# Patient Record
Sex: FEMALE | Race: WHITE | Employment: UNEMPLOYED | RURAL
[De-identification: names, ages, dates, MRNs, and addresses within clinical notes are randomized per-mention and may not be internally consistent; named-entity substitution may affect disease eponyms.]

---

## 2022-06-15 ENCOUNTER — TELEPHONE (OUTPATIENT)
Dept: FAMILY MEDICINE CLINIC | Age: 42
End: 2022-06-15

## 2022-06-20 ENCOUNTER — OFFICE VISIT (OUTPATIENT)
Dept: FAMILY MEDICINE CLINIC | Age: 42
End: 2022-06-20
Payer: MEDICAID

## 2022-06-20 VITALS
SYSTOLIC BLOOD PRESSURE: 148 MMHG | BODY MASS INDEX: 45.99 KG/M2 | HEART RATE: 79 BPM | DIASTOLIC BLOOD PRESSURE: 93 MMHG | TEMPERATURE: 97.6 F | RESPIRATION RATE: 20 BRPM | WEIGHT: 293 LBS | OXYGEN SATURATION: 96 % | HEIGHT: 67 IN

## 2022-06-20 DIAGNOSIS — K50.90 CROHN'S DISEASE WITHOUT COMPLICATION, UNSPECIFIED GASTROINTESTINAL TRACT LOCATION (HCC): ICD-10-CM

## 2022-06-20 DIAGNOSIS — Z72.0 TOBACCO ABUSE: ICD-10-CM

## 2022-06-20 DIAGNOSIS — M54.42 ACUTE MIDLINE LOW BACK PAIN WITH LEFT-SIDED SCIATICA: ICD-10-CM

## 2022-06-20 DIAGNOSIS — R10.30 LOWER ABDOMINAL PAIN: ICD-10-CM

## 2022-06-20 DIAGNOSIS — F41.9 ANXIETY: Primary | ICD-10-CM

## 2022-06-20 DIAGNOSIS — R03.0 ELEVATED BLOOD PRESSURE READING WITHOUT DIAGNOSIS OF HYPERTENSION: ICD-10-CM

## 2022-06-20 LAB
BILIRUB UR QL STRIP: NEGATIVE
GLUCOSE UR-MCNC: NEGATIVE MG/DL
KETONES P FAST UR STRIP-MCNC: NEGATIVE MG/DL
PH UR STRIP: 6 [PH] (ref 4.6–8)
PROT UR QL STRIP: NORMAL
SP GR UR STRIP: 1.02 (ref 1–1.03)
UA UROBILINOGEN AMB POC: NORMAL (ref 0.2–1)
URINALYSIS CLARITY POC: CLEAR
URINALYSIS COLOR POC: YELLOW
URINE BLOOD POC: NORMAL
URINE LEUKOCYTES POC: NORMAL
URINE NITRITES POC: NEGATIVE

## 2022-06-20 PROCEDURE — 81003 URINALYSIS AUTO W/O SCOPE: CPT | Performed by: FAMILY MEDICINE

## 2022-06-20 PROCEDURE — 99204 OFFICE O/P NEW MOD 45 MIN: CPT | Performed by: FAMILY MEDICINE

## 2022-06-20 RX ORDER — ACETAMINOPHEN 325 MG/1
TABLET ORAL
COMMUNITY

## 2022-06-20 RX ORDER — PANTOPRAZOLE SODIUM 40 MG/1
40 TABLET, DELAYED RELEASE ORAL DAILY
COMMUNITY
End: 2022-08-10 | Stop reason: SDUPTHER

## 2022-06-20 RX ORDER — INFLIXIMAB 100 MG/10ML
5 INJECTION, POWDER, LYOPHILIZED, FOR SOLUTION INTRAVENOUS ONCE
COMMUNITY

## 2022-06-20 RX ORDER — VARENICLINE TARTRATE 0.5 MG/1
0.5 TABLET, FILM COATED ORAL DAILY
Qty: 60 TABLET | Refills: 0 | Status: SHIPPED | OUTPATIENT
Start: 2022-06-20 | End: 2022-07-14 | Stop reason: SDUPTHER

## 2022-06-20 RX ORDER — METHYLPREDNISOLONE 4 MG/1
4 TABLET ORAL
Qty: 1 DOSE PACK | Refills: 0 | Status: SHIPPED | OUTPATIENT
Start: 2022-06-20 | End: 2022-06-29

## 2022-06-20 RX ORDER — UREA 10 %
100 LOTION (ML) TOPICAL DAILY
COMMUNITY

## 2022-06-20 RX ORDER — CITALOPRAM 10 MG/1
10 TABLET ORAL DAILY
Qty: 30 TABLET | Refills: 1 | Status: SHIPPED | OUTPATIENT
Start: 2022-06-20 | End: 2022-07-14 | Stop reason: SDUPTHER

## 2022-06-20 RX ORDER — ONDANSETRON 4 MG/1
4 TABLET, FILM COATED ORAL
COMMUNITY

## 2022-06-20 RX ORDER — GLUCOSAMINE SULFATE 1500 MG
POWDER IN PACKET (EA) ORAL DAILY
COMMUNITY

## 2022-06-20 RX ORDER — ALBUTEROL SULFATE 90 UG/1
2 AEROSOL, METERED RESPIRATORY (INHALATION)
Qty: 1 EACH | Refills: 1 | Status: SHIPPED | OUTPATIENT
Start: 2022-06-20 | End: 2023-06-15

## 2022-06-20 NOTE — PROGRESS NOTES
1. \"Have you been to the ER, urgent care clinic since your last visit? Hospitalized since your last visit? \" No    2. \"Have you seen or consulted any other health care providers outside of the [address] [place] [address] since your last visit? \" No     3. For patients aged 39-70: Has the patient had a colonoscopy / FIT/ Cologuard? NA - based on age      If the patient is female:    4. For patients aged 41-77: Has the patient had a mammogram within the past 2 years? NA - based on age or sex      11. For patients aged 21-65: Has the patient had a pap smear?  Yes - no Care Gap present    Health Maintenance Due   Topic Date Due    Hepatitis C Screening  Never done    Depression Screen  Never done    COVID-19 Vaccine (1) Never done    DTaP/Tdap/Td series (1 - Tdap) Never done    Cervical cancer screen  Never done

## 2022-06-20 NOTE — PATIENT INSTRUCTIONS

## 2022-06-20 NOTE — PROGRESS NOTES
[facility]    History of Present Illness:   Krystina Johnston is a 46 y.o. female here for   Chief Complaint   Patient presents with   [facility] Establish Care    Abdominal Pain     Chrohn's is acting up. HPI:  New patient here to establish with me today. Moved from [place] to [place] in the past month. Has a history of Crohn's was previously on Remicade. Her last treatment was in April and she normally gets it every 8 weeks. She was due  but is waiting on her insurance and has not established with GI here. She complains of some abdominal cramping but no blood in her stool or mucus. She also complains of back pain spasms. She is taking care of an 25month-old along with her 3year-old autistic daughter. She is a smoker and was previously on Chantix. She does complain of some shortness of breath. She previously was on Celexa for anxiety. Health Maintenance  Health Maintenance Due   Topic Date Due    Hepatitis C Screening  Never done    Depression Screen  Never done    COVID-19 Vaccine (1) Never done    Pneumococcal 0-64 years (1 - PCV) Never done    DTaP/Tdap/Td series (1 - Tdap) Never done    Cervical cancer screen  Never done       Past Medical, Family, and Social History:     Past Medical History:   Diagnosis Date    GERD (gastroesophageal reflux disease)       Past Surgical History:   Procedure Laterality Date    HX  SECTION  2018    IR CHOLECYSTOSTOMY PERCUTANEOUS         Current Outpatient Medications on File Prior to Visit   Medication Sig Dispense Refill    pantoprazole (Protonix) 40 mg tablet Take 40 mg by mouth daily.  ondansetron hcl (Zofran) 4 mg tablet Take 4 mg by mouth every eight (8) hours as needed for Nausea or Vomiting.  acetaminophen (TylenoL) 325 mg tablet Take  by mouth every four (4) hours as needed for Pain.  cyanocobalamin (Vitamin B-12) 100 mcg tablet Take 100 mcg by mouth daily.       cholecalciferol (Vitamin D3) 25 mcg (1,000 unit) cap Take  by mouth daily.  inFLIXimab (REMICADE) 100 mg injection 5 mg/kg by IntraVENous route once. No current facility-administered medications on file prior to visit. There is no problem list on file for this patient. Social History     Socioeconomic History    Marital status: SINGLE   Tobacco Use    Smoking status: Current Every Day Smoker    Smokeless tobacco: Never Used   Substance and Sexual Activity    Alcohol use: Not Currently    Drug use: Not Currently        Review of Systems   Review of Systems   Constitutional: Negative for chills and fever. Respiratory: Negative for cough and shortness of breath. Cardiovascular: Negative for chest pain. Gastrointestinal: Positive for abdominal pain. Negative for blood in stool. Psychiatric/Behavioral: Positive for depression. Negative for suicidal ideas. The patient is nervous/anxious. Objective:     Visit Vitals  BP (!) (P) 151/93 (BP 1 Location: Left lower arm, BP Patient Position: Sitting, BP Cuff Size: Large adult)   Pulse 79   Temp 97.6 °F (36.4 °C) (Oral)   Resp 20   Ht 5' 7\" (1.702 m)   Wt (!) 363 lb 9.6 oz (164.9 kg)   SpO2 96%   BMI 56.95 kg/m²        Physical Exam  Vitals and nursing note reviewed. Constitutional:       General: She is not in acute distress. Appearance: Normal appearance. She is obese. Cardiovascular:      Rate and Rhythm: Normal rate and regular rhythm. Heart sounds: Normal heart sounds. Pulmonary:      Effort: Pulmonary effort is normal. No respiratory distress. Breath sounds: Normal breath sounds. No wheezing, rhonchi or rales. Musculoskeletal:      Cervical back: Normal.      Thoracic back: Normal.      Lumbar back: Normal.      Comments: Negative SLR  Strength 5/5 BLE     Neurological:      General: No focal deficit present. Mental Status: She is alert and oriented to person, place, and time.    Psychiatric:         Mood and Affect: Mood normal.         Thought Content: Thought content normal.         Judgment: Judgment normal.      Comments: tearful         Pertinent Labs/Studies:  Testing preformed onsite at today's visit:  Results for orders placed or performed in visit on 06/20/22   AMB POC URINALYSIS DIP STICK AUTO W/O MICRO   Result Value Ref Range    Color (UA POC) Yellow     Clarity (UA POC) Clear     Glucose (UA POC) Negative Negative    Bilirubin (UA POC) Negative Negative    Ketones (UA POC) Negative Negative    Specific gravity (UA POC) 1.020 1.001 - 1.035    Blood (UA POC) Trace Negative    pH (UA POC) 6 4.6 - 8.0    Protein (UA POC) 1+ Negative    Urobilinogen (UA POC) 0.2 mg/dL 0.2 - 1    Nitrites (UA POC) Negative Negative    Leukocyte esterase (UA POC) 3+ Negative       Assessment and orders:       ICD-10-CM ICD-9-CM    1. Anxiety  F41.9 300.00 citalopram (CELEXA) 10 mg tablet   2. Lower abdominal pain  R10.30 789.09 AMB POC URINALYSIS DIP STICK AUTO W/O MICRO   3. Acute midline low back pain with left-sided sciatica  M54.42 724.2 methylPREDNISolone (MEDROL DOSEPACK) 4 mg tablet     724.3    4. Crohn's disease without complication, unspecified gastrointestinal tract location (HCC)  K50.90 555.9 REFERRAL TO GASTROENTEROLOGY   5. Tobacco abuse  Z72.0 305.1 albuterol (PROVENTIL HFA, VENTOLIN HFA, PROAIR HFA) 90 mcg/actuation inhaler      varenicline (CHANTIX) 0.5 mg tablet   6. Elevated blood pressure reading without diagnosis of hypertension  R03.0 796.2      Diagnoses and all orders for this visit:    1. Anxiety  -     citalopram (CELEXA) 10 mg tablet; Take 1 Tablet by mouth daily. 2. Lower abdominal pain  -     AMB POC URINALYSIS DIP STICK AUTO W/O MICRO    3. Acute midline low back pain with left-sided sciatica  -     methylPREDNISolone (MEDROL DOSEPACK) 4 mg tablet; Take 1 Tablet by mouth Specific Days and Specific Times. 24 mg on day 1, then decrease by 4 mg /day x 5 days per instructions    4.  Crohn's disease without complication, unspecified gastrointestinal tract location ([facility] [address]  -     REFERRAL TO GASTROENTEROLOGY    5. Tobacco abuse  -     albuterol (PROVENTIL HFA, VENTOLIN HFA, PROAIR HFA) 90 mcg/actuation inhaler; Take 2 Puffs by inhalation every four (4) hours as needed for Wheezing for up to 360 days. Indications: asthma attack, use covered gerneric.  -     varenicline (CHANTIX) 0.5 mg tablet; Take 1 Tablet by mouth daily. X 3 days then 0.5 mg po bid x 4 days then 1 mg twice daily    6. Elevated blood pressure reading without diagnosis of hypertension    Patient Instructions  I advised reduction of dietary salt intake, DASH diet, take medications as prescribed and exercise daily. Follow-up and Dispositions    · Return in about 1 week (around 6/27/2022) for blood pressure. the following changes in treatment are made: start celexa  reviewed diet, exercise and weight control  very strongly urged to quit smoking to reduce cardiovascular risk    RECOMMENDATIONS given include: F/U with MD if pain worsens or fails to resolve as anticipated. F/U ASAP for any new associated numbness, weakness or GI/ incontinence or fevers or chills. I have discussed the diagnosis with the patient and the intended plan as seen in the above orders. Social history, medical history, and labs were reviewed. The patient has received an after-visit summary and questions were answered concerning future plans. I have discussed medication side effects and warnings with the patient as well. Patient/guardian verbalized understanding and accepts plan & risks. Spent 47 min with patient, reviewing chart and face to face exam, clinical documentation.     MD ABDON Leigh & DEL ARGUETA [facility]  06/20/22

## 2022-06-29 ENCOUNTER — OFFICE VISIT (OUTPATIENT)
Dept: FAMILY MEDICINE CLINIC | Age: 42
End: 2022-06-29
Payer: MEDICAID

## 2022-06-29 VITALS
BODY MASS INDEX: 45.99 KG/M2 | OXYGEN SATURATION: 98 % | RESPIRATION RATE: 20 BRPM | TEMPERATURE: 99.1 F | HEART RATE: 90 BPM | WEIGHT: 293 LBS | HEIGHT: 67 IN | SYSTOLIC BLOOD PRESSURE: 123 MMHG | DIASTOLIC BLOOD PRESSURE: 75 MMHG

## 2022-06-29 DIAGNOSIS — R03.0 ELEVATED BLOOD PRESSURE READING WITHOUT DIAGNOSIS OF HYPERTENSION: Primary | ICD-10-CM

## 2022-06-29 DIAGNOSIS — M54.2 CERVICALGIA: ICD-10-CM

## 2022-06-29 PROCEDURE — 99213 OFFICE O/P EST LOW 20 MIN: CPT | Performed by: FAMILY MEDICINE

## 2022-06-29 RX ORDER — DICLOFENAC SODIUM 10 MG/G
4 GEL TOPICAL 4 TIMES DAILY
Qty: 100 G | Refills: 5 | Status: SHIPPED | OUTPATIENT
Start: 2022-06-29

## 2022-06-29 NOTE — PROGRESS NOTES
1. \"Have you been to the ER, urgent care clinic since your last visit? Hospitalized since your last visit? \" No    2. \"Have you seen or consulted any other health care providers outside of the [address] [place] [address] since your last visit? \" No     3. For patients aged 39-70: Has the patient had a colonoscopy / FIT/ Cologuard? NA - based on age      If the patient is female:    4. For patients aged 41-77: Has the patient had a mammogram within the past 2 years? NA - based on age or sex      11. For patients aged 21-65: Has the patient had a pap smear?  No    Health Maintenance Due   Topic Date Due    Hepatitis C Screening  Never done    Depression Screen  Never done    COVID-19 Vaccine (1) Never done    Pneumococcal 0-64 years (1 - PCV) Never done    DTaP/Tdap/Td series (1 - Tdap) Never done    Cervical cancer screen  Never done

## 2022-06-29 NOTE — PROGRESS NOTES
[facility]    History of Present Illness:   Kerry Hancock is a 46 y.o. female here for   Chief Complaint   Patient presents with    Follow-up     blood pressure          HPI:  Here for follow-up elevated blood pressure. She was seen last week as a new patient-- blood pressure was 148/93. No history of hypertension. Advised DASH diet. Has a history of Crohn's was previously on Remicade. Her last treatment was in April and she normally gets it every 8 weeks. She was due  but is waiting on her insurance . She saw GI yesterday. She continues to complains of neck pain. Says it hurts when she bends  her head forward and goes into her shoulder. she is taking care of an 25month-old along with her 3year-old autistic daughter. We restarted Celexa for anxiety last week. Started Chantix last week to help with smoking cessation. Tolerating both meds fine. Past Medical, Family, and Social History:     Past Medical History:   Diagnosis Date    GERD (gastroesophageal reflux disease)       Past Surgical History:   Procedure Laterality Date    HX  SECTION  2018    IR CHOLECYSTOSTOMY PERCUTANEOUS         Current Outpatient Medications on File Prior to Visit   Medication Sig Dispense Refill    pantoprazole (Protonix) 40 mg tablet Take 40 mg by mouth daily.  ondansetron hcl (Zofran) 4 mg tablet Take 4 mg by mouth every eight (8) hours as needed for Nausea or Vomiting.  acetaminophen (TylenoL) 325 mg tablet Take  by mouth every four (4) hours as needed for Pain.  cyanocobalamin (Vitamin B-12) 100 mcg tablet Take 100 mcg by mouth daily.  cholecalciferol (Vitamin D3) 25 mcg (1,000 unit) cap Take  by mouth daily.  inFLIXimab (REMICADE) 100 mg injection 5 mg/kg by IntraVENous route once.       albuterol (PROVENTIL HFA, VENTOLIN HFA, PROAIR HFA) 90 mcg/actuation inhaler Take 2 Puffs by inhalation every four (4) hours as needed for Wheezing for up to 360 days. Indications: asthma attack, use covered gerneric. 1 Each 1    varenicline (CHANTIX) 0.5 mg tablet Take 1 Tablet by mouth daily. X 3 days then 0.5 mg po bid x 4 days then 1 mg twice daily 60 Tablet 0    citalopram (CELEXA) 10 mg tablet Take 1 Tablet by mouth daily. 30 Tablet 1    [DISCONTINUED] methylPREDNISolone (MEDROL DOSEPACK) 4 mg tablet Take 1 Tablet by mouth Specific Days and Specific Times. 24 mg on day 1, then decrease by 4 mg /day x 5 days per instructions 1 Dose Pack 0     No current facility-administered medications on file prior to visit. There is no problem list on file for this patient. Social History     Socioeconomic History    Marital status: SINGLE   Tobacco Use    Smoking status: Current Every Day Smoker    Smokeless tobacco: Never Used   Substance and Sexual Activity    Alcohol use: Not Currently    Drug use: Not Currently        Review of Systems   Review of Systems   Constitutional: Negative for chills and fever. Respiratory: Negative for cough and shortness of breath. Musculoskeletal: Positive for back pain and neck pain. Objective:     Visit Vitals  /75 (BP 1 Location: Left lower arm, BP Patient Position: Sitting, BP Cuff Size: Large adult)   Pulse 90   Temp 99.1 °F (37.3 °C) (Oral)   Resp 20   Ht 5' 7\" (1.702 m)   Wt (!) 352 lb (159.7 kg)   SpO2 98%   BMI 55.13 kg/m²        Physical Exam  PHYSICAL EXAM:  Gen: Pt sitting in chair, in NAD  Head: Normocephalic, atraumatic  Eyes: Sclera anicteric, EOM grossly intact,  Ears: TM's pearly with good light reflex b/l  Throat: MMM, normal lips, tongue, teeth and gums  Neck: No tenderness to palpation of the cervical spine. Some pain with flexion. CVS: Normal S1, S2, no m/r/g  Resp: CTAB, no wheezes or rales  Neuro: Alert, oriented, appropriate          Assessment and orders:       ICD-10-CM ICD-9-CM    1.  Elevated blood pressure reading without diagnosis of hypertension  R03.0 796.2 2. Cervicalgia  M54.2 723.1 diclofenac (VOLTAREN) 1 % gel     Diagnoses and all orders for this visit:    1. Elevated blood pressure reading without diagnosis of hypertension  Improved after sitting. Patient Instructions  I advised reduction of dietary salt intake, DASH diet, and exercise daily. 2. Cervicalgia  -     diclofenac (VOLTAREN) 1 % gel; Apply 4 g to affected area four (4) times daily. Advised range of motion exercises. I offered physical therapy referral but due to childcare issues she is unable to do that at this time. Follow-up next month for recheck on Celexa and Chantix. Follow-up and Dispositions    · Return in about 4 weeks (around 7/27/2022). current treatment plan is effective, no change in therapy      I have discussed the diagnosis with the patient and the intended plan as seen in the above orders. Social history, medical history, and labs were reviewed. The patient has received an after-visit summary and questions were answered concerning future plans. I have discussed medication side effects and warnings with the patient as well. Patient/guardian verbalized understanding and accepts plan & risks.      MD ABDON Kaminski & DEL ARGUETA [facility]  06/29/22

## 2022-06-29 NOTE — PATIENT INSTRUCTIONS
Neck: Exercises  Introduction  Here are some examples of exercises for you to try. The exercises may be suggested for a condition or for rehabilitation. Start each exercise slowly. Ease off the exercises if you start to have pain. You will be told when to start these exercises and which ones will work best for you. How to do the exercises  Neck stretch    1. This stretch works best if you keep your shoulder down as you lean away from it. To help you remember to do this, start by relaxing your shoulders and lightly holding on to your thighs or your chair. 2. Tilt your head toward your shoulder and hold for 15 to 30 seconds. Let the weight of your head stretch your muscles. 3. If you would like a little added stretch, use your hand to gently and steadily pull your head toward your shoulder. For example, keeping your right shoulder down, lean your head to the left. 4. Repeat 2 to 4 times toward each shoulder. Diagonal neck stretch    1. Turn your head slightly toward the direction you will be stretching, and tilt your head diagonally toward your chest and hold for 15 to 30 seconds. 2. If you would like a little added stretch, use your hand to gently and steadily pull your head forward on the diagonal.  3. Repeat 2 to 4 times toward each side. Dorsal glide stretch    The dorsal glide stretches the back of the neck. If you feel pain, do not glide so far back. Some people find this exercise easier to do while lying on their backs with an ice pack on the neck. 1. Sit or stand tall and look straight ahead. 2. Slowly tuck your chin as you glide your head backward over your body  3. Hold for a count of 6, and then relax for up to 10 seconds. 4. Repeat 8 to 12 times. Chest and shoulder stretch    1. Sit or stand tall and glide your head backward as in the dorsal glide stretch. 2. Raise both arms so that your hands are next to your ears.   3. Take a deep breath, and as you breathe out, lower your elbows down and behind your back. You will feel your shoulder blades slide down and together, and at the same time you will feel a stretch across your chest and the front of your shoulders. 4. Hold for about 6 seconds, and then relax for up to 10 seconds. 5. Repeat 8 to 12 times. Strengthening: Hands on head    1. Move your head backward, forward, and side to side against gentle pressure from your hands, holding each position for about 6 seconds. 2. Repeat 8 to 12 times. Follow-up care is a key part of your treatment and safety. Be sure to make and go to all appointments, and call your doctor if you are having problems. It's also a good idea to know your test results and keep a list of the medicines you take. Where can you learn more? Go to http://www.barraza.com/  Enter P975 in the search box to learn more about \"Neck: Exercises. \"  Current as of: July 1, 2021               Content Version: 13.2  © 2006-2022 [employer]. Care instructions adapted under license by [employer] (which disclaims liability or warranty for this information). If you have questions about a medical condition or this instruction, always ask your healthcare professional. [address] any warranty or liability for your use of this information.

## 2022-07-14 DIAGNOSIS — F41.9 ANXIETY: ICD-10-CM

## 2022-07-14 DIAGNOSIS — Z72.0 TOBACCO ABUSE: ICD-10-CM

## 2022-07-14 RX ORDER — CITALOPRAM 10 MG/1
10 TABLET ORAL DAILY
Qty: 30 TABLET | Refills: 1 | Status: SHIPPED | OUTPATIENT
Start: 2022-07-14

## 2022-07-14 RX ORDER — VARENICLINE TARTRATE 0.5 MG/1
0.5 TABLET, FILM COATED ORAL DAILY
Qty: 60 TABLET | Refills: 0 | Status: SHIPPED | OUTPATIENT
Start: 2022-07-14 | End: 2022-07-25 | Stop reason: ALTCHOICE

## 2022-07-18 ENCOUNTER — NURSE TRIAGE (OUTPATIENT)
Dept: OTHER | Facility: CLINIC | Age: 42
End: 2022-07-18

## 2022-07-18 NOTE — TELEPHONE ENCOUNTER
Received call from [address] at [facility] with Red Flag Complaint. Subjective: Caller states \"Having Chrohn's flare\"     Current Symptoms: Can't eat or drink  Bloating  Constant gummy BM's    Onset: 2 days ago    Pain Severity: 8/10, constant, sharp, stabbing    Temperature: Denies fever, chills and sweats     What has been tried: Remicade due 6/2 and did not have d/t moving and not getting     History related to the reason for today's call: Chrohns    LMP: NA Pregnant: No    Recommended disposition: See in Office Within 2 Weeks    Care advice provided, patient verbalizes understanding; denies any other questions or concerns; instructed to call back for any new or worsening symptoms. Patient/Caller agrees with recommended disposition; writer provided warm transfer to [facility] at [facility] for appointment scheduling    Attention Provider: Thank you for allowing me to participate in the care of your patient. The patient was connected to triage in response to information provided to the [facility]. Please do not respond through this encounter as the response is not directed to a shared pool.     Reason for Disposition   Abdominal pain is a chronic symptom (recurrent or ongoing AND lasting > 4 weeks)    Protocols used: ABDOMINAL PAIN - [facility] - ROSINA CHENG

## 2022-07-19 NOTE — TELEPHONE ENCOUNTER
VM left for patient to reach out to Dr. Aguero Side office regarding Chrohanna's flare up as that he is treating her for it.

## 2022-07-22 ENCOUNTER — PATIENT MESSAGE (OUTPATIENT)
Dept: FAMILY MEDICINE CLINIC | Age: 42
End: 2022-07-22

## 2022-07-25 ENCOUNTER — OFFICE VISIT (OUTPATIENT)
Dept: FAMILY MEDICINE CLINIC | Age: 42
End: 2022-07-25
Payer: MEDICAID

## 2022-07-25 VITALS
HEART RATE: 85 BPM | HEIGHT: 67 IN | SYSTOLIC BLOOD PRESSURE: 129 MMHG | WEIGHT: 293 LBS | TEMPERATURE: 98 F | DIASTOLIC BLOOD PRESSURE: 82 MMHG | BODY MASS INDEX: 45.99 KG/M2 | OXYGEN SATURATION: 97 % | RESPIRATION RATE: 20 BRPM

## 2022-07-25 DIAGNOSIS — Z72.0 TOBACCO ABUSE: ICD-10-CM

## 2022-07-25 DIAGNOSIS — R51.9 ACUTE NONINTRACTABLE HEADACHE, UNSPECIFIED HEADACHE TYPE: ICD-10-CM

## 2022-07-25 DIAGNOSIS — N92.1 METRORRHAGIA: ICD-10-CM

## 2022-07-25 DIAGNOSIS — R42 DIZZINESS: Primary | ICD-10-CM

## 2022-07-25 DIAGNOSIS — Z30.013 ENCOUNTER FOR INITIAL PRESCRIPTION OF INJECTABLE CONTRACEPTIVE: ICD-10-CM

## 2022-07-25 LAB
HCG URINE, QL. (POC): NEGATIVE
VALID INTERNAL CONTROL?: YES

## 2022-07-25 PROCEDURE — 81025 URINE PREGNANCY TEST: CPT | Performed by: FAMILY MEDICINE

## 2022-07-25 PROCEDURE — 99214 OFFICE O/P EST MOD 30 MIN: CPT | Performed by: FAMILY MEDICINE

## 2022-07-25 RX ORDER — VARENICLINE TARTRATE 1 MG/1
1 TABLET, FILM COATED ORAL 2 TIMES DAILY
Qty: 60 TABLET | Refills: 1 | Status: SHIPPED | OUTPATIENT
Start: 2022-07-25 | End: 2022-10-03

## 2022-07-25 RX ORDER — SUMATRIPTAN 100 MG/1
100 TABLET, FILM COATED ORAL
Qty: 9 TABLET | Refills: 0 | Status: SHIPPED | OUTPATIENT
Start: 2022-07-25 | End: 2022-07-25

## 2022-07-25 RX ORDER — MEDROXYPROGESTERONE ACETATE 150 MG/ML
150 INJECTION, SUSPENSION INTRAMUSCULAR ONCE
Qty: 1 ML | Refills: 0 | Status: SHIPPED | OUTPATIENT
Start: 2022-07-25 | End: 2022-07-25

## 2022-07-25 RX ORDER — VARENICLINE TARTRATE 1 MG/1
1 TABLET, FILM COATED ORAL
COMMUNITY
Start: 2022-06-28 | End: 2022-07-25 | Stop reason: SDUPTHER

## 2022-07-25 NOTE — PROGRESS NOTES
1. \"Have you been to the ER, urgent care clinic since your last visit? Hospitalized since your last visit? \" No    2. \"Have you seen or consulted any other health care providers outside of the [address] [place] [address] since your last visit? \" No     3. For patients aged 39-70: Has the patient had a colonoscopy / FIT/ Cologuard? NA - based on age      If the patient is female:    4. For patients aged 41-77: Has the patient had a mammogram within the past 2 years? NA - based on age or sex      11. For patients aged 21-65: Has the patient had a pap smear?  Yes - no Care Gap present    Health Maintenance Due   Topic Date Due    Hepatitis C Screening  Never done    Depression Screen  Never done    COVID-19 Vaccine (1) Never done    Pneumococcal 0-64 years (1 - PCV) Never done    DTaP/Tdap/Td series (1 - Tdap) Never done    Cervical cancer screen  Never done

## 2022-07-25 NOTE — PROGRESS NOTES
[facility]    History of Present Illness:   Brenda Manzo is a 46 y.o. female here for   Chief Complaint   Patient presents with    Headache    Dizziness    Medication Refill     Chantix    Irregular Menses         HPI:  Patient complaining of dizziness and headaches. Headache started for about a week located in the front. Intermittent. Tyelnol and excedrin migrane helps some. Unfortunately she cannot take NSAIDs due to Crohn's. She has been in touch with her GI doctor and is awaiting testing. Some blurred vision. No h/o migranes. ? photophobia    On chantix, down to 3 cigs over 3 days. Needs a refill on 1 mg tablets today. She complains of irregular menses. States that her LMP was  and was heavy but only lasted a few days. Previously she had a period of about 19 days earlier. She is not presently sexually active but plans to become so in the future and is interested in Depo. Past Medical, Family, and Social History:     Past Medical History:   Diagnosis Date    Crohn disease ([facility] [address]     GERD (gastroesophageal reflux disease)       Past Surgical History:   Procedure Laterality Date    HX  SECTION  2018    IR CHOLECYSTOSTOMY PERCUTANEOUS  2009       Current Outpatient Medications on File Prior to Visit   Medication Sig Dispense Refill    varenicline (Chantix) 1 mg tablet Take 1 mg by mouth.      citalopram (CELEXA) 10 mg tablet Take 1 Tablet by mouth daily. 30 Tablet 1    [DISCONTINUED] varenicline (CHANTIX) 0.5 mg tablet Take 1 Tablet by mouth daily. X 3 days then 0.5 mg po bid x 4 days then 1 mg twice daily 60 Tablet 0    diclofenac (VOLTAREN) 1 % gel Apply 4 g to affected area four (4) times daily. 100 g 5    pantoprazole (Protonix) 40 mg tablet Take 40 mg by mouth daily. ondansetron hcl (Zofran) 4 mg tablet Take 4 mg by mouth every eight (8) hours as needed for Nausea or Vomiting.       acetaminophen (TylenoL) 325 mg tablet Take  by mouth every four (4) hours as needed for Pain. cyanocobalamin (Vitamin B-12) 100 mcg tablet Take 100 mcg by mouth daily. cholecalciferol (Vitamin D3) 25 mcg (1,000 unit) cap Take  by mouth daily. inFLIXimab (REMICADE) 100 mg injection 5 mg/kg by IntraVENous route once. albuterol (PROVENTIL HFA, VENTOLIN HFA, PROAIR HFA) 90 mcg/actuation inhaler Take 2 Puffs by inhalation every four (4) hours as needed for Wheezing for up to 360 days. Indications: asthma attack, use covered gerneric. 1 Each 1     No current facility-administered medications on file prior to visit. Patient Active Problem List   Diagnosis Code    [facility] disease ([facility] K50.90       Social History     Socioeconomic History    Marital status: SINGLE   Tobacco Use    Smoking status: Every Day    Smokeless tobacco: Never   Substance and Sexual Activity    Alcohol use: Not Currently    Drug use: Not Currently        Review of Systems   Review of Systems   Constitutional:  Negative for chills and fever. Respiratory:  Negative for cough and shortness of breath. Cardiovascular:  Negative for chest pain. Gastrointestinal:  Negative for abdominal pain. Neurological:  Positive for dizziness and headaches. Objective:   Visit Vitals  /82 (BP 1 Location: Left upper arm, BP Patient Position: Sitting, BP Cuff Size: Large adult)   Pulse 85   Temp 98 °F (36.7 °C) (Oral)   Resp 20   Ht 5' 7\" (1.702 m)   Wt (!) 356 lb 9.6 oz (161.8 kg)   SpO2 97%   BMI 55.85 kg/m²        Physical Exam  Vitals and nursing note reviewed. Constitutional:       Appearance: Normal appearance. HENT:      Head: Normocephalic and atraumatic. Right Ear: Tympanic membrane, ear canal and external ear normal.      Left Ear: Tympanic membrane, ear canal and external ear normal.   Eyes:      Extraocular Movements: Extraocular movements intact.       Conjunctiva/sclera: Conjunctivae normal.      Comments: PERRL  No papilledema   Cardiovascular:      Rate and Rhythm: Normal rate and regular rhythm. Heart sounds: Normal heart sounds. Pulmonary:      Effort: Pulmonary effort is normal.      Breath sounds: Normal breath sounds. Abdominal:      General: Abdomen is flat. Bowel sounds are normal.      Palpations: Abdomen is soft. Neurological:      General: No focal deficit present. Mental Status: She is alert and oriented to person, place, and time. Cranial Nerves: No cranial nerve deficit. Pertinent Labs/Studies:  Testing preformed onsite at today's visit:  Results for orders placed or performed in visit on 07/25/22   AMB POC URINE PREGNANCY TEST, VISUAL COLOR COMPARISON   Result Value Ref Range    VALID INTERNAL CONTROL POC Yes     HCG urine, Ql. (POC) Negative Negative           Assessment and orders:       ICD-10-CM ICD-9-CM    1. Dizziness  R42 780.4 CBC WITH AUTOMATED DIFF      METABOLIC PANEL, COMPREHENSIVE      METABOLIC PANEL, COMPREHENSIVE      CBC WITH AUTOMATED DIFF      2. Acute nonintractable headache, unspecified headache type  R51.9 784.0 SUMAtriptan (IMITREX) 100 mg tablet      3. Metrorrhagia  N92.1 626.6 TSH 3RD GENERATION      TSH 3RD GENERATION      AMB POC URINE PREGNANCY TEST, VISUAL COLOR COMPARISON      4. Tobacco abuse  Z72.0 305.1 varenicline (Chantix) 1 mg tablet      5. Encounter for initial prescription of injectable contraceptive  Z30.013 V25.02 medroxyPROGESTERone (DEPO-PROVERA) 150 mg/mL injection        Diagnoses and all orders for this visit:    1. Dizziness  -     CBC WITH AUTOMATED DIFF; Future  -     METABOLIC PANEL, COMPREHENSIVE; Future    2. Acute nonintractable headache, unspecified headache type  -     SUMAtriptan (IMITREX) 100 mg tablet; Take 1 Tablet by mouth once as needed for Migraine for up to 1 dose. Repeat dose in 2 hrs if no improvement    3. Metrorrhagia  -     TSH 3RD GENERATION; Future  -     AMB POC URINE PREGNANCY TEST, VISUAL COLOR COMPARISON    4.  Tobacco abuse  -     varenicline (Chantix) 1 mg tablet; Take 1 Tablet by mouth two (2) times a day. 5. Encounter for initial prescription of injectable contraceptive  -     medroxyPROGESTERone (DEPO-PROVERA) 150 mg/mL injection; 1 mL by IntraMUSCular route once for 1 dose. Follow-up and Dispositions    Return in about 17 days (around 8/11/2022) for contraception. the following changes in treatment are made: Add Imitrex  Headache activity that is not controlled. Advising to start rest, sleeping and modify activities to reduce precipitating factors including: stress, lack of sleep. Further work up to include: Imitrex oral  Labs as ordered  Discussed lifestyle issues (diet, sleep, exercise)  Recheck in 2 weeks, see below orders for details. Discussed strict ER precautions in the case of development of Alarm symptoms (Changes in headaches, stroke-like symptoms, etc). Patient is in agreement with current plan.    lab results and schedule of future lab studies reviewed with patient  very strongly urged to quit smoking to reduce cardiovascular risk  reviewed medications and side effects in detail    Advised patient of importance of using condoms to prevent STDS and pregnancy. I discussed the increased risk of blood clots with estrogen. Urine pregnancy negative here today. Advised to start Depo with on first day of menses if able. Patient verbalized understanding and accepts risks. Spent 35 min with patient, reviewing chart and face to face exam, clinical documentation. I have discussed the diagnosis with the patient and the intended plan as seen in the above orders. Social history, medical history, and labs were reviewed. The patient has received an after-visit summary and questions were answered concerning future plans. I have discussed medication side effects and warnings with the patient as well. Patient/guardian verbalized understanding and accepts plan & risks.      MD ABDON Powell & DEL ARGUETA [facility]  07/25/22

## 2022-07-27 LAB
ALBUMIN SERPL-MCNC: 4.1 G/DL (ref 3.8–4.8)
ALBUMIN/GLOB SERPL: 1.5 {RATIO} (ref 1.2–2.2)
ALP SERPL-CCNC: 88 IU/L (ref 44–121)
ALT SERPL-CCNC: 20 IU/L (ref 0–32)
AST SERPL-CCNC: 20 IU/L (ref 0–40)
BASOPHILS # BLD AUTO: 0.1 X10E3/UL (ref 0–0.2)
BASOPHILS NFR BLD AUTO: 1 %
BILIRUB SERPL-MCNC: 0.4 MG/DL (ref 0–1.2)
BUN SERPL-MCNC: 6 MG/DL (ref 6–20)
BUN/CREAT SERPL: 8 (ref 9–23)
CALCIUM SERPL-MCNC: 9.3 MG/DL (ref 8.7–10.2)
CHLORIDE SERPL-SCNC: 106 MMOL/L (ref 96–106)
CO2 SERPL-SCNC: 19 MMOL/L (ref 20–29)
CREAT SERPL-MCNC: 0.72 MG/DL (ref 0.57–1)
EGFR: 109 ML/MIN/1.73
EOSINOPHIL # BLD AUTO: 0.2 X10E3/UL (ref 0–0.4)
EOSINOPHIL NFR BLD AUTO: 2 %
ERYTHROCYTE [DISTWIDTH] IN BLOOD BY AUTOMATED COUNT: 13.6 % (ref 11.7–15.4)
GLOBULIN SER CALC-MCNC: 2.7 G/DL (ref 1.5–4.5)
GLUCOSE SERPL-MCNC: 82 MG/DL (ref 65–99)
HCT VFR BLD AUTO: 42.3 % (ref 34–46.6)
HGB BLD-MCNC: 13.3 G/DL (ref 11.1–15.9)
IMM GRANULOCYTES # BLD AUTO: 0.1 X10E3/UL (ref 0–0.1)
IMM GRANULOCYTES NFR BLD AUTO: 1 %
LYMPHOCYTES # BLD AUTO: 3.2 X10E3/UL (ref 0.7–3.1)
LYMPHOCYTES NFR BLD AUTO: 32 %
MCH RBC QN AUTO: 29.4 PG (ref 26.6–33)
MCHC RBC AUTO-ENTMCNC: 31.4 G/DL (ref 31.5–35.7)
MCV RBC AUTO: 93 FL (ref 79–97)
MONOCYTES # BLD AUTO: 0.5 X10E3/UL (ref 0.1–0.9)
MONOCYTES NFR BLD AUTO: 5 %
NEUTROPHILS # BLD AUTO: 5.9 X10E3/UL (ref 1.4–7)
NEUTROPHILS NFR BLD AUTO: 59 %
PLATELET # BLD AUTO: 347 X10E3/UL (ref 150–450)
POTASSIUM SERPL-SCNC: 4.1 MMOL/L (ref 3.5–5.2)
PROT SERPL-MCNC: 6.8 G/DL (ref 6–8.5)
RBC # BLD AUTO: 4.53 X10E6/UL (ref 3.77–5.28)
SODIUM SERPL-SCNC: 141 MMOL/L (ref 134–144)
TSH SERPL DL<=0.005 MIU/L-ACNC: 2.23 UIU/ML (ref 0.45–4.5)
WBC # BLD AUTO: 10 X10E3/UL (ref 3.4–10.8)

## 2022-08-10 ENCOUNTER — OFFICE VISIT (OUTPATIENT)
Dept: FAMILY MEDICINE CLINIC | Age: 42
End: 2022-08-10
Payer: MEDICAID

## 2022-08-10 VITALS
SYSTOLIC BLOOD PRESSURE: 107 MMHG | HEIGHT: 67 IN | HEART RATE: 83 BPM | BODY MASS INDEX: 45.99 KG/M2 | RESPIRATION RATE: 20 BRPM | WEIGHT: 293 LBS | OXYGEN SATURATION: 99 % | TEMPERATURE: 98.1 F | DIASTOLIC BLOOD PRESSURE: 73 MMHG

## 2022-08-10 DIAGNOSIS — K21.9 GASTROESOPHAGEAL REFLUX DISEASE WITHOUT ESOPHAGITIS: ICD-10-CM

## 2022-08-10 DIAGNOSIS — J30.1 ALLERGIC RHINITIS DUE TO POLLEN, UNSPECIFIED SEASONALITY: Primary | ICD-10-CM

## 2022-08-10 PROCEDURE — 99213 OFFICE O/P EST LOW 20 MIN: CPT | Performed by: FAMILY MEDICINE

## 2022-08-10 RX ORDER — FLUTICASONE PROPIONATE 50 MCG
2 SPRAY, SUSPENSION (ML) NASAL DAILY
Qty: 1 EACH | Refills: 2 | Status: SHIPPED | OUTPATIENT
Start: 2022-08-10

## 2022-08-10 RX ORDER — PANTOPRAZOLE SODIUM 40 MG/1
40 TABLET, DELAYED RELEASE ORAL DAILY
Qty: 30 TABLET | Refills: 2 | Status: SHIPPED | OUTPATIENT
Start: 2022-08-10

## 2022-08-10 NOTE — PROGRESS NOTES
[facility]    History of Present Illness:   Jack Eugene is a 46 y.o. female here for   Chief Complaint   Patient presents with    Ear Pain    Ear Fullness     Unable to hear out of left ear. Ear feels stopped up. Started 2 days ago. It's making her off balance. HPI:  Patient here today with complaints of left ear feeling stopped up. She noticed it 2 days ago. It is hard to hear out of the ear. It is making her feel off balance. She was seen on  for dizziness. Started on Imitrex for headaches. Had labs done CBC & BMP that were normal.  She is also in a house with 7 cats. Also interested in starting Depo. Health Maintenance  Health Maintenance Due   Topic Date Due    Hepatitis C Screening  Never done    COVID-19 Vaccine (1) Never done    Pneumococcal 0-64 years (1 - PCV) Never done    DTaP/Tdap/Td series (1 - Tdap) Never done    Cervical cancer screen  Never done       Past Medical, Family, and Social History:     Past Medical History:   Diagnosis Date    Anxiety     Crohn disease ([address]     GERD (gastroesophageal reflux disease)       Past Surgical History:   Procedure Laterality Date    HX  SECTION  2018    IR CHOLECYSTOSTOMY PERCUTANEOUS         Current Outpatient Medications on File Prior to Visit   Medication Sig Dispense Refill    varenicline (Chantix) 1 mg tablet Take 1 Tablet by mouth two (2) times a day. 60 Tablet 1    citalopram (CELEXA) 10 mg tablet Take 1 Tablet by mouth daily. 30 Tablet 1    diclofenac (VOLTAREN) 1 % gel Apply 4 g to affected area four (4) times daily. 100 g 5    ondansetron hcl (ZOFRAN) 4 mg tablet Take 4 mg by mouth every eight (8) hours as needed for Nausea or Vomiting. acetaminophen (TYLENOL) 325 mg tablet Take  by mouth every four (4) hours as needed for Pain. cyanocobalamin (VITAMIN B12) 100 mcg tablet Take 100 mcg by mouth daily.       cholecalciferol (VITAMIN D3) 25 mcg (1,000 unit) cap Take  by mouth daily. albuterol (PROVENTIL HFA, VENTOLIN HFA, PROAIR HFA) 90 mcg/actuation inhaler Take 2 Puffs by inhalation every four (4) hours as needed for Wheezing for up to 360 days. Indications: asthma attack, use covered gerneric. 1 Each 1    inFLIXimab (REMICADE) 100 mg injection 5 mg/kg by IntraVENous route once. (Patient not taking: Reported on 8/10/2022)      [DISCONTINUED] pantoprazole (PROTONIX) 40 mg tablet Take 40 mg by mouth daily. (Patient not taking: Reported on 8/10/2022)       No current facility-administered medications on file prior to visit. Patient Active Problem List   Diagnosis Code    [facility] K50.90       Social History     Socioeconomic History    Marital status: SINGLE   Tobacco Use    Smoking status: Every Day    Smokeless tobacco: Never   Substance and Sexual Activity    Alcohol use: Not Currently    Drug use: Not Currently        Review of Systems   Review of Systems   Constitutional:  Negative for chills and fever. HENT:  Positive for ear pain and hearing loss. Negative for congestion and ear discharge. Respiratory:  Negative for cough and shortness of breath. Gastrointestinal:  Positive for heartburn.      Objective:   Visit Vitals  /73 (BP 1 Location: Right upper arm, BP Patient Position: Sitting, BP Cuff Size: Large adult)   Pulse 83   Temp 98.1 °F (36.7 °C) (Oral)   Resp 20   Ht 5' 7\" (1.702 m)   Wt (!) 352 lb 12.8 oz (160 kg)   SpO2 99%   BMI 55.26 kg/m²        Physical Exam  PHYSICAL EXAM:  Gen: Pt sitting in chair, in NAD  Head: Normocephalic, atraumatic  Eyes: Sclera anicteric, EOM grossly intact,  Ears: TM's pearly with good light reflex b/l, small effusion present L ear  Throat: MMM, normal lips, tongue, teeth and gums  Neck: Supple, no LAD,   CVS: Normal S1, S2, no m/r/g  Resp: CTAB, no wheezes or rales  Neuro: Alert, oriented, appropriate    Pertinent Labs/Studies:  3 most recent PHQ Screens 8/10/2022   Little interest or pleasure in doing things Not at all   Feeling down, depressed, irritable, or hopeless Not at all   Total Score PHQ 2 0          Assessment and orders:       ICD-10-CM ICD-9-CM    1. Allergic rhinitis due to pollen, unspecified seasonality  J30.1 477.0 fluticasone propionate (FLONASE) 50 mcg/actuation nasal spray      2. Gastroesophageal reflux disease without esophagitis  K21.9 530.81 pantoprazole (PROTONIX) 40 mg tablet        Diagnoses and all orders for this visit:    1. Allergic rhinitis due to pollen, unspecified seasonality  -     fluticasone propionate (FLONASE) 50 mcg/actuation nasal spray; Take 2 Sprays by Both Nostrils route in the morning. 2. Gastroesophageal reflux disease without esophagitis  -     pantoprazole (PROTONIX) 40 mg tablet; Take 1 Tablet by mouth in the morning. Indications: gastroesophageal reflux disease    Follow-up and Dispositions    Return if symptoms worsen or fail to improve. Trial of flonase, may use saline as well. Avoid afrin due to elevated bp previously. Add claritin if no improvement. I have discussed the diagnosis with the patient and the intended plan as seen in the above orders. Social history, medical history, and labs were reviewed. The patient has received an after-visit summary and questions were answered concerning future plans. I have discussed medication side effects and warnings with the patient as well. Patient/guardian verbalized understanding and accepts plan & risks. Please note that this dictation was completed with [employer], the computer voice recognition software. Quite often unanticipated grammatical, syntax, homophones, and other interpretive errors are inadvertently transcribed by the computer software. Please disregard these errors. Please excuse any errors that have escaped final proofreading. Thank you.      MD ABDON Ramírez & DEL ARGUETA [facility]  08/10/22

## 2022-08-10 NOTE — PROGRESS NOTES
1. \"Have you been to the ER, urgent care clinic since your last visit? Hospitalized since your last visit? \" No    2. \"Have you seen or consulted any other health care providers outside of the [address] [place] [address] since your last visit? \" No     3. For patients aged 39-70: Has the patient had a colonoscopy / FIT/ Cologuard? No      If the patient is female:    4. For patients aged 41-77: Has the patient had a mammogram within the past 2 years? No      5. For patients aged 21-65: Has the patient had a pap smear?  No    Health Maintenance Due   Topic Date Due    Hepatitis C Screening  Never done    COVID-19 Vaccine (1) Never done    Pneumococcal 0-64 years (1 - PCV) Never done    DTaP/Tdap/Td series (1 - Tdap) Never done    Cervical cancer screen  Never done

## 2022-08-10 NOTE — LETTER
NOTIFICATION RETURN TO WORK / SCHOOL    8/10/2022 8:27 AM    Ms. Ambrose Beckford      To Whom It May Concern:    Hema Vásquez is currently under the care of [facility]. She will return to work/school on: 8/11/22. Please excuse 8/9/22 & 8/10/22. If there are questions or concerns please have the patient contact our office.         Sincerely,      Stwe Hurley MD

## 2022-09-15 ENCOUNTER — TELEPHONE (OUTPATIENT)
Dept: FAMILY MEDICINE CLINIC | Age: 42
End: 2022-09-15

## 2022-09-15 NOTE — TELEPHONE ENCOUNTER
VM left foe patient to call the office regarding lab order from gastro. Patient advised to go to lab [facility]. To have labs done. Due to code it may not be covered by insurance.

## 2022-09-16 ENCOUNTER — TELEPHONE (OUTPATIENT)
Dept: FAMILY MEDICINE CLINIC | Age: 42
End: 2022-09-16

## 2022-10-03 DIAGNOSIS — Z72.0 TOBACCO ABUSE: ICD-10-CM

## 2022-10-03 RX ORDER — VARENICLINE TARTRATE 1 MG/1
TABLET, FILM COATED ORAL
Qty: 60 TABLET | Refills: 0 | Status: SHIPPED | OUTPATIENT
Start: 2022-10-03

## 2022-10-10 ENCOUNTER — NURSE TRIAGE (OUTPATIENT)
Dept: OTHER | Facility: CLINIC | Age: 42
End: 2022-10-10

## 2022-10-10 NOTE — TELEPHONE ENCOUNTER
Received call from [facility] with [employer] Complaint. Subjective: Caller states \"I have a cough\"     Current Symptoms: SOB at time call. Thought it was her allergies. Hoarse voice. Onset: 1 week ago; worsening    Associated Symptoms: reduced activity    Pain Severity: 0/10; Temperature: denies fever     What has been tried: Flonase, Dayquil    LMP: NA Pregnant: NA    Recommended disposition: Go to ED Now    Care advice provided, patient verbalizes understanding; denies any other questions or concerns; instructed to call back for any new or worsening symptoms. Patient/caller agrees to proceed to [facility] Emergency Department    Attention Provider: Thank you for allowing me to participate in the care of your patient. The patient was connected to triage in response to information provided to the [facility]. Please do not respond through this encounter as the response is not directed to a shared pool.     Reason for Disposition   MODERATE difficulty breathing (e.g., speaks in phrases, SOB even at rest, pulse 100-120) of new-onset or worse than normal    Protocols used: Breathing Difficulty-ADULT-[place]

## 2022-10-17 ENCOUNTER — TELEPHONE (OUTPATIENT)
Dept: FAMILY MEDICINE CLINIC | Age: 42
End: 2022-10-17

## 2022-10-17 NOTE — TELEPHONE ENCOUNTER
Patient states that she will call back in let us know about appt. Yes needs appt and records. Can do 11:40 on Wed or Thursday this week. LY                   Spoke with patient and she stated that she went to [facility] on last Monday and was treated for bronchitis with prednisone. She still has a cough asking for a refill on prednisone. I asked her did they do a x-ray and she said yes. I told  her she may need to be seen in the office. QUESTIONS  Name of Medication? Other - prednisone 15 mg tablets  Patient-reported dosage and instructions? 1 tablet by mouth every morning  How many days do you have left? 0  Preferred Pharmacy? [facility]  Pharmacy phone number (if available)? [phone]  Additional Information for Provider? This was prescribed by ED, Dr. Sampson Matta @ [facility] from an ER visit.  ---------------------------------------------------------------------------  --------------  [address]  What is the best way for the office to contact you? OK to leave message on   voicemail  Preferred Call Back Phone Number? [phone]  ---------------------------------------------------------------------------  --------------  SCRIPT ANSWERS  Relationship to Patient?  Self

## 2022-11-22 ENCOUNTER — OFFICE VISIT (OUTPATIENT)
Dept: FAMILY MEDICINE CLINIC | Age: 42
End: 2022-11-22
Payer: MEDICAID

## 2022-11-22 VITALS
HEIGHT: 67 IN | SYSTOLIC BLOOD PRESSURE: 133 MMHG | TEMPERATURE: 98 F | OXYGEN SATURATION: 95 % | HEART RATE: 90 BPM | RESPIRATION RATE: 18 BRPM | BODY MASS INDEX: 45.99 KG/M2 | DIASTOLIC BLOOD PRESSURE: 82 MMHG | WEIGHT: 293 LBS

## 2022-11-22 DIAGNOSIS — N12 PYELONEPHRITIS: Primary | ICD-10-CM

## 2022-11-22 DIAGNOSIS — R30.0 BURNING WITH URINATION: ICD-10-CM

## 2022-11-22 DIAGNOSIS — Z32.00 ENCOUNTER FOR PREGNANCY TEST, RESULT UNKNOWN: ICD-10-CM

## 2022-11-22 LAB
BILIRUB UR QL STRIP: NEGATIVE
GLUCOSE UR-MCNC: NEGATIVE MG/DL
HCG URINE, QL. (POC): NEGATIVE
KETONES P FAST UR STRIP-MCNC: NEGATIVE MG/DL
PH UR STRIP: 5.5 [PH] (ref 4.6–8)
PROT UR QL STRIP: NEGATIVE
SP GR UR STRIP: 1.02 (ref 1–1.03)
UA UROBILINOGEN AMB POC: NORMAL (ref 0.2–1)
URINALYSIS CLARITY POC: NORMAL
URINALYSIS COLOR POC: YELLOW
URINE BLOOD POC: NEGATIVE
URINE LEUKOCYTES POC: NORMAL
URINE NITRITES POC: POSITIVE
VALID INTERNAL CONTROL?: YES

## 2022-11-22 PROCEDURE — 99213 OFFICE O/P EST LOW 20 MIN: CPT

## 2022-11-22 PROCEDURE — 81025 URINE PREGNANCY TEST: CPT

## 2022-11-22 PROCEDURE — 81003 URINALYSIS AUTO W/O SCOPE: CPT

## 2022-11-22 RX ORDER — CIPROFLOXACIN 500 MG/1
500 TABLET ORAL EVERY 12 HOURS
Qty: 14 TABLET | Refills: 0 | Status: SHIPPED | OUTPATIENT
Start: 2022-11-22 | End: 2022-11-28 | Stop reason: ALTCHOICE

## 2022-11-22 RX ORDER — HYDROCODONE BITARTRATE AND ACETAMINOPHEN 5; 325 MG/1; MG/1
1 TABLET ORAL
Qty: 16 TABLET | Refills: 0 | Status: SHIPPED | OUTPATIENT
Start: 2022-11-22 | End: 2022-11-26

## 2022-11-22 NOTE — PROGRESS NOTES
History of Present Illness:  Estrella Gomez is a 38 y.o. female with a pmhx of pyelonephritis end of October s/p Cefdinir. She is here today c/o dysuria, urgency, fatigue, fever/chills and L sided abdo and back pain since . Says it feels like she has pyelo again. She denies hematuria, nausea/vomiting, changes to bowel. Past Medical History:   Diagnosis Date    Anxiety     Crohn disease ([address]     GERD (gastroesophageal reflux disease)        Past Surgical History:   Procedure Laterality Date    HX  SECTION  2018    IR CHOLECYSTOSTOMY PERCUTANEOUS         Current Outpatient Medications   Medication Sig Dispense Refill    ciprofloxacin HCl (CIPRO) 500 mg tablet Take 1 Tablet by mouth every twelve (12) hours for 7 days. 14 Tablet 0    HYDROcodone-acetaminophen (NORCO) 5-325 mg per tablet Take 1 Tablet by mouth every six (6) hours as needed for Pain for up to 4 days. Max Daily Amount: 4 Tablets. 16 Tablet 0    pantoprazole (PROTONIX) 40 mg tablet Take 1 Tablet by mouth in the morning. Indications: gastroesophageal reflux disease 30 Tablet 2    fluticasone propionate (FLONASE) 50 mcg/actuation nasal spray Take 2 Sprays by Both Nostrils route in the morning. 1 Each 2    citalopram (CELEXA) 10 mg tablet Take 1 Tablet by mouth daily. 30 Tablet 1    diclofenac (VOLTAREN) 1 % gel Apply 4 g to affected area four (4) times daily. 100 g 5    ondansetron hcl (ZOFRAN) 4 mg tablet Take 4 mg by mouth every eight (8) hours as needed for Nausea or Vomiting. acetaminophen (TYLENOL) 325 mg tablet Take  by mouth every four (4) hours as needed for Pain. cyanocobalamin (VITAMIN B12) 100 mcg tablet Take 100 mcg by mouth daily. inFLIXimab (REMICADE) 100 mg injection 5 mg/kg by IntraVENous route once. albuterol (PROVENTIL HFA, VENTOLIN HFA, PROAIR HFA) 90 mcg/actuation inhaler Take 2 Puffs by inhalation every four (4) hours as needed for Wheezing for up to 360 days.  Indications: asthma attack, use covered gerneric. 1 Each 1    varenicline (CHANTIX) 1 mg tablet Take 1 tablet by mouth twice daily (Patient not taking: Reported on 11/22/2022) 60 Tablet 0    cholecalciferol (VITAMIN D3) 25 mcg (1,000 unit) cap Take  by mouth daily. (Patient not taking: Reported on 11/22/2022)         Allergies   Allergen Reactions    Amoxicillin Hives       Social History     Tobacco Use    Smoking status: Every Day    Smokeless tobacco: Never   Substance Use Topics    Alcohol use: Not Currently    Drug use: Not Currently       No family history on file. Physical Examination:     Visit Vitals  /82 (BP 1 Location: Left upper arm, BP Patient Position: Sitting, BP Cuff Size: Adult)   Pulse 90   Temp 98 °F (36.7 °C) (Oral)   Resp 18   Ht 5' 7\" (1.702 m)   Wt (!) 365 lb (165.6 kg)   SpO2 95%   BMI 57.17 kg/m²       GEN: No apparent distress. Alert and oriented and responds to all questions appropriately. EYES:  Conjunctiva clear; extraocular movements areintact. EAR: External ears are normal.         LUNGS: Respirations unlabored; clear to auscultation bilaterally  CARDIOVASCULAR: Regular, rate, and rhythm   ABDOMEN: generalized tenderness worse on L side with L CVA tenderness  NEUROLOGIC:  No focal neurologic deficits. SKIN: No obvious rashes.     Results for orders placed or performed in visit on 11/22/22   AMB POC URINALYSIS DIP STICK AUTO W/O MICRO     Status: None   Result Value Ref Range Status    Color (UA POC) Yellow  Final    Clarity (UA POC) Cloudy  Final    Glucose (UA POC) Negative Negative Final    Bilirubin (UA POC) Negative Negative Final    Ketones (UA POC) Negative Negative Final    Specific gravity (UA POC) 1.020 1.001 - 1.035 Final    Blood (UA POC) Negative Negative Final    pH (UA POC) 5.5 4.6 - 8.0 Final    Protein (UA POC) Negative Negative Final    Urobilinogen (UA POC) 0.2 mg/dL 0.2 - 1 Final    Nitrites (UA POC) Positive Negative Final    Leukocyte esterase (UA POC) 1+ Negative Final       Assessment/Plan:    Diagnoses and all orders for this visit:    1. Pyelonephritis  - UTI with L CVA tenderness.   - Given patient is hemodynamically stable and not having N/V, will treat outpatient with course of Cipro. - ED precautions given  -     ciprofloxacin HCl (CIPRO) 500 mg tablet; Take 1 Tablet by mouth every twelve (12) hours for 7 days.  -     HYDROcodone-acetaminophen (NORCO) 5-325 mg per tablet; Take 1 Tablet by mouth every six (6) hours as needed for Pain for up to 4 days. Max Daily Amount: 4 Tablets. -     AMB POC URINALYSIS DIP STICK AUTO W/O MICRO  -     CULTURE, URINE; Future  -     AMB POC URINE PREGNANCY TEST, VISUAL COLOR COMPARISON    2. Burning with urination  - UA positive for nitrites and LE.   - Urine culture pending  -     AMB POC URINALYSIS DIP STICK AUTO W/O MICRO  -     CULTURE, URINE; Future    3. Encounter for pregnancy test, result unknown  - negative UPT. Okay to treat with Ciprofloxacin. Lab Results   Component Value Date/Time    HCG urine, Ql. (POC) Negative 11/22/2022 04:26 PM     -     AMB POC URINE PREGNANCY TEST, VISUAL COLOR COMPARISON      Encounter Diagnoses     ICD-10-CM ICD-9-CM   1. Pyelonephritis  N12 590.80   2. Burning with urination  R30.0 788.1   3. Encounter for pregnancy test, result unknown  Z32.00 V72.40             Dao Lung expressed understanding of this plan. An AVS was printed and given to the patient. Seen and discussed with attending.     Gwenith Holter, MD  Family Medicine PGY-1

## 2022-11-22 NOTE — PROGRESS NOTES
1. \"Have you been to the ER, urgent care clinic since your last visit? Hospitalized since your last visit? \" Yes When: [address]     2. \"Have you seen or consulted any other health care providers outside of the [address] [place] [address] since your last visit? \" No     3. For patients aged 39-70: Has the patient had a colonoscopy / FIT/ Cologuard? NA - based on age      If the patient is female:    4. For patients aged 41-77: Has the patient had a mammogram within the past 2 years? Yes - no Care Gap present      5. For patients aged 21-65: Has the patient had a pap smear?  No    Health Maintenance Due   Topic Date Due    Hepatitis C Screening  Never done    COVID-19 Vaccine (1) Never done    Pneumococcal 0-64 years (1 - PCV) Never done    DTaP/Tdap/Td series (1 - Tdap) Never done    Cervical cancer screen  Never done    Flu Vaccine (1) Never done    Lipid Screen  Never done

## 2022-11-28 DIAGNOSIS — N12 PYELONEPHRITIS: Primary | ICD-10-CM

## 2022-11-28 RX ORDER — SULFAMETHOXAZOLE AND TRIMETHOPRIM 800; 160 MG/1; MG/1
1 TABLET ORAL 2 TIMES DAILY
Qty: 28 TABLET | Refills: 0 | Status: SHIPPED | OUTPATIENT
Start: 2022-11-29 | End: 2022-12-13

## 2022-11-28 RX ORDER — SULFAMETHOXAZOLE AND TRIMETHOPRIM 800; 160 MG/1; MG/1
1 TABLET ORAL 2 TIMES DAILY
Qty: 28 TABLET | Refills: 0 | Status: CANCELLED | OUTPATIENT
Start: 2022-11-28 | End: 2022-12-12

## 2022-11-29 NOTE — PROGRESS NOTES
Ucx growing E coli resistant to Ciprofloxacin, which patient is currently on for pyelo treatment. Plan to d/c cipro and start TMP-SMX.

## 2022-12-02 DIAGNOSIS — F41.9 ANXIETY: ICD-10-CM

## 2022-12-03 RX ORDER — CITALOPRAM 10 MG/1
TABLET ORAL
Qty: 30 TABLET | Refills: 1 | Status: SHIPPED | OUTPATIENT
Start: 2022-12-03

## 2023-01-04 ENCOUNTER — OFFICE VISIT (OUTPATIENT)
Dept: FAMILY MEDICINE CLINIC | Age: 43
End: 2023-01-04
Payer: MEDICAID

## 2023-01-04 VITALS
WEIGHT: 293 LBS | HEIGHT: 67 IN | BODY MASS INDEX: 45.99 KG/M2 | RESPIRATION RATE: 18 BRPM | TEMPERATURE: 97.9 F | OXYGEN SATURATION: 97 % | HEART RATE: 98 BPM

## 2023-01-04 DIAGNOSIS — J30.1 ALLERGIC RHINITIS DUE TO POLLEN, UNSPECIFIED SEASONALITY: ICD-10-CM

## 2023-01-04 DIAGNOSIS — R30.0 DYSURIA: Primary | ICD-10-CM

## 2023-01-04 DIAGNOSIS — K21.9 GASTROESOPHAGEAL REFLUX DISEASE WITHOUT ESOPHAGITIS: ICD-10-CM

## 2023-01-04 LAB
BILIRUB UR QL STRIP: NEGATIVE
GLUCOSE UR-MCNC: NEGATIVE MG/DL
KETONES P FAST UR STRIP-MCNC: NEGATIVE MG/DL
PH UR STRIP: 6 [PH] (ref 4.6–8)
PROT UR QL STRIP: NEGATIVE
SP GR UR STRIP: 1.02 (ref 1–1.03)
UA UROBILINOGEN AMB POC: NORMAL (ref 0.2–1)
URINALYSIS CLARITY POC: CLEAR
URINALYSIS COLOR POC: YELLOW
URINE BLOOD POC: NEGATIVE
URINE LEUKOCYTES POC: NORMAL
URINE NITRITES POC: NEGATIVE

## 2023-01-04 PROCEDURE — 99213 OFFICE O/P EST LOW 20 MIN: CPT | Performed by: FAMILY MEDICINE

## 2023-01-04 PROCEDURE — 81003 URINALYSIS AUTO W/O SCOPE: CPT | Performed by: FAMILY MEDICINE

## 2023-01-04 RX ORDER — PANTOPRAZOLE SODIUM 40 MG/1
TABLET, DELAYED RELEASE ORAL
Qty: 30 TABLET | Refills: 0 | Status: SHIPPED | OUTPATIENT
Start: 2023-01-04

## 2023-01-04 RX ORDER — FLUTICASONE PROPIONATE 50 MCG
SPRAY, SUSPENSION (ML) NASAL
Qty: 16 G | Refills: 0 | Status: SHIPPED | OUTPATIENT
Start: 2023-01-04

## 2023-01-04 RX ORDER — SULFAMETHOXAZOLE AND TRIMETHOPRIM 800; 160 MG/1; MG/1
1 TABLET ORAL 2 TIMES DAILY
Qty: 6 TABLET | Refills: 0 | Status: SHIPPED | OUTPATIENT
Start: 2023-01-04 | End: 2023-01-07

## 2023-01-04 NOTE — PROGRESS NOTES
[facility]    History of Present Illness:   Joshua Dowling is a 38 y.o. female here for   Chief Complaint   Patient presents with    UTI     Flank pain, painful urination        C/o for dysuria, urgency and frequency for 1 week. No f/c/n/v. Complaining of night sweats as well as flank pain. Vaginal discharge, clear, used monistat and that has resolved. .    Culture positive  ecoli, resistant to cipro   Prior that she went to [facility] for presumed UTI but those records are not available. She says her urinary issues seem to be related to menses. C/o itching with pad use. She has Crohn's disease and is scheduled to get her Remicade and fusion tomorrow. She does have chronic diarrhea but this is unchanged. Past Medical History:   Diagnosis Date    Anxiety     Crohn disease ([email or web address]     GERD (gastroesophageal reflux disease)      Past Surgical History:   Procedure Laterality Date    HX  SECTION  2018    IR CHOLECYSTOSTOMY PERCUTANEOUS       Patient Active Problem List   Diagnosis Code    Crohn disease ([facility].) K50.90       Meds:   Current Outpatient Medications   Medication Sig Dispense Refill    trimethoprim-sulfamethoxazole (BACTRIM DS, SEPTRA DS) 160-800 mg per tablet Take 1 Tablet by mouth two (2) times a day for 3 days. 6 Tablet 0    citalopram (CELEXA) 10 mg tablet Take 1 tablet by mouth once daily 30 Tablet 1    pantoprazole (PROTONIX) 40 mg tablet Take 1 Tablet by mouth in the morning. Indications: gastroesophageal reflux disease 30 Tablet 2    fluticasone propionate (FLONASE) 50 mcg/actuation nasal spray Take 2 Sprays by Both Nostrils route in the morning. 1 Each 2    diclofenac (VOLTAREN) 1 % gel Apply 4 g to affected area four (4) times daily. 100 g 5    ondansetron hcl (ZOFRAN) 4 mg tablet Take 4 mg by mouth every eight (8) hours as needed for Nausea or Vomiting.       acetaminophen (TYLENOL) 325 mg tablet Take  by mouth every four (4) hours as needed for Pain. cholecalciferol (VITAMIN D3) 25 mcg (1,000 unit) cap Take  by mouth daily. inFLIXimab (REMICADE) 100 mg injection 5 mg/kg by IntraVENous route once. albuterol (PROVENTIL HFA, VENTOLIN HFA, PROAIR HFA) 90 mcg/actuation inhaler Take 2 Puffs by inhalation every four (4) hours as needed for Wheezing for up to 360 days. Indications: asthma attack, use covered gerneric. 1 Each 1    varenicline (CHANTIX) 1 mg tablet Take 1 tablet by mouth twice daily (Patient not taking: No sig reported) 60 Tablet 0    cyanocobalamin (VITAMIN B12) 100 mcg tablet Take 100 mcg by mouth daily. (Patient not taking: Reported on 1/4/2023)         Allergies: Allergies   Allergen Reactions    Amoxicillin Hives       Smoker:  Social History     Tobacco Use   Smoking Status Every Day   Smokeless Tobacco Never       ETOH:   Social History     Substance and Sexual Activity   Alcohol Use Not Currently       FH: No family history on file. ROS:  General/Constitutional:   No fever     Respiratory:   No cough or shortness of breath     GI:   No nausea/vomiting, +abdominal pain       :   Dysuria, urgency and frequency but no hematuria        Physical Exam:  Visit Vitals  Pulse 98   Temp 97.9 °F (36.6 °C) (Oral)   Resp 18   Ht 5' 7\" (1.702 m)   Wt (!) 361 lb (163.7 kg)   LMP 12/05/2022 (Approximate)   SpO2 97%   BMI 56.54 kg/m²     Gen: NAD. Responds to all questions appropriately. Eye: Conjunctiva are clear. Lungs: No labored respirations. CTAB. CV: RRR; no m/r/g  Back: Equivocal left CVA tenderness  Abdomen: +BS. Soft. tender to palpation left upper quadrant and suprapubic area.  No rebound or guarding      Lab:  UA:  Testing preformed onsite at today's visit:  Results for orders placed or performed in visit on 01/04/23   AMB POC URINALYSIS DIP STICK AUTO W/O MICRO   Result Value Ref Range    Color (UA POC) Yellow     Clarity (UA POC) Clear     Glucose (UA POC) Negative Negative    Bilirubin (UA POC) Negative Negative Ketones (UA POC) Negative Negative    Specific gravity (UA POC) 1.020 1.001 - 1.035    Blood (UA POC) Negative Negative    pH (UA POC) 6.0 4.6 - 8.0    Protein (UA POC) Negative Negative    Urobilinogen (UA POC) 0.2 mg/dL 0.2 - 1    Nitrites (UA POC) Negative Negative    Leukocyte esterase (UA POC) 1+ Negative         Assessment and orders:       ICD-10-CM ICD-9-CM    1. Dysuria  R30.0 788.1 AMB POC URINALYSIS DIP STICK AUTO W/O MICRO      CULTURE, URINE      trimethoprim-sulfamethoxazole (BACTRIM DS, SEPTRA DS) 160-800 mg per tablet      CANCELED: CULTURE, URINE        Diagnoses and all orders for this visit:    1. Dysuria  -     AMB POC URINALYSIS DIP STICK AUTO W/O MICRO  -     CULTURE, URINE; Future  -     trimethoprim-sulfamethoxazole (BACTRIM DS, SEPTRA DS) 160-800 mg per tablet; Take 1 Tablet by mouth two (2) times a day for 3 days. lab results and schedule of future lab studies reviewed with patient  reviewed medications and side effects in detail    General instruction:  1. Drink plenty of fluids. 2. Follow-up if symptoms not improving in 2-3 days. RECOMMENDATIONS given include: F/U with MD if symptoms worsen or fail to improve and resolve with tx. F/U ASAP with MD/ED for increasing back pain, N/V or visible hematuria as these could be signs of a more serious kidney infection. I have discussed the diagnosis with the patient and the intended plan as seen in the above orders. Social history, medical history, and labs were reviewed. The patient has received an after-visit summary and questions were answered concerning future plans. I have discussed medication side effects and warnings with the patient as well. Patient/guardian verbalized understanding and accepts plan & risks.    Tereso Rolon MD

## 2023-01-04 NOTE — PATIENT INSTRUCTIONS
Urinary Tract Infection (UTI) in Women: Care Instructions  Overview     A urinary tract infection, or UTI, is a general term for an infection anywhere between the kidneys and the urethra (where urine comes out). Most UTIs are bladder infections. They often cause pain or burning when you urinate. UTIs are caused by bacteria and can be cured with antibiotics. Be sure to complete your treatment so that the infection does not get worse. Follow-up care is a key part of your treatment and safety. Be sure to make and go to all appointments, and call your doctor if you are having problems. It's also a good idea to know your test results and keep a list of the medicines you take. How can you care for yourself at home? Take your antibiotics as directed. Do not stop taking them just because you feel better. You need to take the full course of antibiotics. Drink extra water and other fluids for the next day or two. This will help make the urine less concentrated and help wash out the bacteria that are causing the infection. (If you have kidney, heart, or liver disease and have to limit fluids, talk with your doctor before you increase the amount of fluids you drink.)  Avoid drinks that are carbonated or have caffeine. They can irritate the bladder. Urinate often. Try to empty your bladder each time. To relieve pain, take a hot bath or lay a heating pad set on low over your lower belly or genital area. Never go to sleep with a heating pad in place. To prevent UTIs  Drink plenty of water each day. This helps you urinate often, which clears bacteria from your system. (If you have kidney, heart, or liver disease and have to limit fluids, talk with your doctor before you increase the amount of fluids you drink.)  Urinate when you need to. If you are sexually active, urinate right after you have sex. Change sanitary pads often.   Avoid douches, bubble baths, feminine hygiene sprays, and other feminine hygiene products that have deodorants. After going to the bathroom, wipe from front to back. When should you call for help? Call your doctor now or seek immediate medical care if:    Symptoms such as fever, chills, nausea, or vomiting get worse or appear for the first time. You have new pain in your back just below your rib cage. This is called flank pain. There is new blood or pus in your urine. You have any problems with your antibiotic medicine. Watch closely for changes in your health, and be sure to contact your doctor if:    You are not getting better after taking an antibiotic for 2 days. Your symptoms go away but then come back. Where can you learn more? Go to http://www.gray.com/  Enter M005 in the search box to learn more about \"Urinary Tract Infection (UTI) in Women: Care Instructions. \"  Current as of: June 16, 2022               Content Version: 13.4  © 2197-5002 [employer]. Care instructions adapted under license by [employer] (which disclaims liability or warranty for this information). If you have questions about a medical condition or this instruction, always ask your healthcare professional. [place] [address] any warranty or liability for your use of this information.

## 2023-01-04 NOTE — PROGRESS NOTES
1. \"Have you been to the ER, urgent care clinic since your last visit? Hospitalized since your last visit? \" No    2. \"Have you seen or consulted any other health care providers outside of the [address] [place] [address] since your last visit? \" No     3. For patients aged 39-70: Has the patient had a colonoscopy / FIT/ Cologuard? NA - based on age      If the patient is female:    4. For patients aged 41-77: Has the patient had a mammogram within the past 2 years? NA - based on age or sex      11. For patients aged 21-65: Has the patient had a pap smear? Yes - Care Gap present.  Rooming MA/LPN to request most recent results    Health Maintenance Due   Topic Date Due    Hepatitis C Screening  Never done    COVID-19 Vaccine (1) Never done    Pneumococcal 0-64 years (1 - PCV) Never done    DTaP/Tdap/Td series (1 - Tdap) Never done    Cervical cancer screen  Never done    Flu Vaccine (1) Never done    Lipid Screen  Never done

## 2023-01-05 ENCOUNTER — PATIENT MESSAGE (OUTPATIENT)
Dept: FAMILY MEDICINE CLINIC | Age: 43
End: 2023-01-05

## 2023-01-05 NOTE — TELEPHONE ENCOUNTER
Patient advised that she has to eat before taking her medicine. Patient hadn't eaten before taking antibiotic that's why she was sick.

## 2023-01-07 LAB
BACTERIA UR CULT: ABNORMAL
BACTERIA UR CULT: ABNORMAL

## 2023-01-12 ENCOUNTER — OFFICE VISIT (OUTPATIENT)
Dept: FAMILY MEDICINE CLINIC | Age: 43
End: 2023-01-12
Payer: MEDICAID

## 2023-01-12 VITALS
BODY MASS INDEX: 45.99 KG/M2 | DIASTOLIC BLOOD PRESSURE: 85 MMHG | WEIGHT: 293 LBS | OXYGEN SATURATION: 97 % | RESPIRATION RATE: 16 BRPM | SYSTOLIC BLOOD PRESSURE: 133 MMHG | HEART RATE: 79 BPM | HEIGHT: 67 IN | TEMPERATURE: 99.2 F

## 2023-01-12 DIAGNOSIS — R20.2 PARESTHESIA: ICD-10-CM

## 2023-01-12 DIAGNOSIS — M54.9 CHRONIC MIDLINE BACK PAIN, UNSPECIFIED BACK LOCATION: Primary | ICD-10-CM

## 2023-01-12 DIAGNOSIS — G89.29 CHRONIC MIDLINE BACK PAIN, UNSPECIFIED BACK LOCATION: Primary | ICD-10-CM

## 2023-01-12 DIAGNOSIS — Z11.59 NEED FOR HEPATITIS C SCREENING TEST: ICD-10-CM

## 2023-01-12 DIAGNOSIS — Z13.220 SCREENING FOR LIPID DISORDERS: ICD-10-CM

## 2023-01-12 PROCEDURE — 99214 OFFICE O/P EST MOD 30 MIN: CPT | Performed by: FAMILY MEDICINE

## 2023-01-12 NOTE — PROGRESS NOTES
1. Have you been to the ER, urgent care clinic since your last visit? Hospitalized since your last visit? No    2. Have you seen or consulted any other health care providers outside of the [address] [place] [address] since your last visit? Include any pap smears or colon screening. Yes When: chrohn's infusion dr Jodie Cushing [facility]     3. For patients aged 39-70: Has the patient had a colonoscopy / FIT/ Cologuard? 3/2021 [facility]    If the patient is female:    4. For patients aged 41-77: Has the patient had a mammogram within the past 2 years? No      5. For patients aged 21-65: Has the patient had a pap smear? 3/2022 [address] [facility] Gleason record in preparation for visit and have necessary documentation  Pt did not bring medication to office visit for review  Patient is accompanied by self I have received verbal consent from Kem Hawkins to discuss any/all medical information while they are present in the room.     Goals that were addressed and/or need to be completed during or after this appointment include     Health Maintenance Due   Topic Date Due    Hepatitis C Screening  Never done    COVID-19 Vaccine (1) Never done    Pneumococcal 0-64 years (1 - PCV) Never done    DTaP/Tdap/Td series (1 - Tdap) Never done    Cervical cancer screen  Never done    Flu Vaccine (1) Never done    Lipid Screen  Never done

## 2023-01-12 NOTE — PROGRESS NOTES
[facility]    History of Present Illness:   Josefina Lema is a 38 y.o. female here for   Chief Complaint   Patient presents with    Follow-up         HPI:  Here for follow-up chronic care. She was recently seen for urinary tract infection and has finished those antibiotics. She denies any dysuria today. She had humira yesterday. Had trouble getting a vein. Abd pain better. She does complain of chronic back pain and left sciatica. However recently her neck has been bothering her more and she is having some numbness in her hands. Symptoms have worsened after she recently started working at [employer] Due   Topic Date Due    Hepatitis C Screening  Never done    COVID-19 Vaccine (1) Never done    Pneumococcal 0-64 years (1 - PCV) Never done    DTaP/Tdap/Td series (1 - Tdap) Never done    Cervical cancer screen  Never done    Flu Vaccine (1) Never done    Lipid Screen  Never done       Past Medical, Family, and Social History:     Past Medical History:   Diagnosis Date    Anxiety     Crohn disease ([facility]     GERD (gastroesophageal reflux disease)       Past Surgical History:   Procedure Laterality Date    HX  SECTION  2018    IR CHOLECYSTOSTOMY PERCUTANEOUS  2009       Current Outpatient Medications on File Prior to Visit   Medication Sig Dispense Refill    fluticasone propionate (FLONASE) 50 mcg/actuation nasal spray USE 2 SPRAY(S) IN EACH NOSTRIL IN THE MORNING 16 g 0    pantoprazole (PROTONIX) 40 mg tablet TAKE 1 TABLET BY MOUTH IN THE MORNING FOR  GASTROESOPHAGEAL  REFLUX  DISEASE 30 Tablet 0    citalopram (CELEXA) 10 mg tablet Take 1 tablet by mouth once daily 30 Tablet 1    diclofenac (VOLTAREN) 1 % gel Apply 4 g to affected area four (4) times daily. 100 g 5    ondansetron hcl (ZOFRAN) 4 mg tablet Take 4 mg by mouth every eight (8) hours as needed for Nausea or Vomiting.       acetaminophen (TYLENOL) 325 mg tablet Take  by mouth every four (4) hours as needed for Pain. cholecalciferol (VITAMIN D3) 25 mcg (1,000 unit) cap Take  by mouth daily. inFLIXimab (REMICADE) 100 mg injection 5 mg/kg by IntraVENous route once. albuterol (PROVENTIL HFA, VENTOLIN HFA, PROAIR HFA) 90 mcg/actuation inhaler Take 2 Puffs by inhalation every four (4) hours as needed for Wheezing for up to 360 days. Indications: asthma attack, use covered gerneric. 1 Each 1    varenicline (CHANTIX) 1 mg tablet Take 1 tablet by mouth twice daily (Patient not taking: No sig reported) 60 Tablet 0    cyanocobalamin (VITAMIN B12) 100 mcg tablet Take 100 mcg by mouth daily. (Patient not taking: No sig reported)       No current facility-administered medications on file prior to visit. Patient Active Problem List   Diagnosis Code    [facility] K50.90       Social History     Socioeconomic History    Marital status: SINGLE   Tobacco Use    Smoking status: Every Day    Smokeless tobacco: Never   Substance and Sexual Activity    Alcohol use: Not Currently    Drug use: Not Currently     Social Determinants of Health     Financial Resource Strain: High Risk    Difficulty of Paying Living Expenses: Hard   Food Insecurity: No Food Insecurity    Worried About Running Out of Food in the Last Year: Never true    Ran Out of Food in the Last Year: Never true        Review of Systems   Review of Systems   Constitutional:  Negative for chills and fever. Respiratory:  Negative for shortness of breath. Genitourinary:  Negative for dysuria, frequency and urgency. Musculoskeletal:  Positive for back pain. Neurological:  Positive for tingling.      Objective:   Visit Vitals  /85 (BP 1 Location: Right upper arm, BP Patient Position: Sitting, BP Cuff Size: Large adult)   Pulse 79   Temp 99.2 °F (37.3 °C) (Oral)   Resp 16   Ht 5' 7\" (1.702 m)   Wt (!) 365 lb 3.2 oz (165.7 kg)   LMP 12/20/2022 (Exact Date)   SpO2 97%   BMI 57.20 kg/m²        Physical Exam  Vitals and nursing note reviewed. Constitutional:       General: She is not in acute distress. Appearance: Normal appearance. Cardiovascular:      Rate and Rhythm: Normal rate and regular rhythm. Heart sounds: Normal heart sounds. Pulmonary:      Effort: Pulmonary effort is normal. No respiratory distress. Breath sounds: Normal breath sounds. No wheezing, rhonchi or rales. Musculoskeletal:      Cervical back: Tenderness present. No swelling, deformity, signs of trauma or bony tenderness. Thoracic back: Tenderness present. No swelling, deformity, spasms or bony tenderness. Lumbar back: Tenderness present. No swelling or spasms. Negative right straight leg raise test and negative left straight leg raise test.      Comments: Negative SLR  Strength 5/5 BLE  Dtr 2+ knee   Neurological:      Mental Status: She is alert. Pertinent Labs/Studies:  3 most recent PHQ Screens 1/12/2023   Little interest or pleasure in doing things Not at all   Feeling down, depressed, irritable, or hopeless Not at all   Total Score PHQ 2 0        Assessment and orders:       ICD-10-CM ICD-9-CM    1. Chronic midline back pain, unspecified back location  M54.9 724.5 REFERRAL TO PHYSICAL THERAPY    G89.29 338.29       2. Paresthesia  R20.2 782.0 VITAMIN B12      3. Need for hepatitis C screening test  Z11.59 V73.89 HEPATITIS C AB      4. Screening for lipid disorders  Z13.220 V77.91 LIPID PANEL        Diagnoses and all orders for this visit:    1. Chronic midline back pain, unspecified back location  -     REFERRAL TO PHYSICAL THERAPY    2. Paresthesia  -     VITAMIN B12; Future    3. Need for hepatitis C screening test  -     HEPATITIS C AB; Future    4. Screening for lipid disorders  -     LIPID PANEL; Future    Other orders  -     pneumococcal 20-nicholas conj-dip, PF, (PREVNAR 20) 0.5 mL syrg injection; 0.5 mL by IntraMUSCular route once for 1 dose.     Follow-up and Dispositions    Return for cpe, morning appt, 40 min. RECOMMENDATIONS given include: Ice, Heat, ROM, Stretching recommended and maintain normal daily movements as much as possible to avoid stiffness and prolonged healing. Avoid heavy lifting or straining. F/U with MD if pain worsens or fails to resolve as anticipated. Check labs next ov--patient refused today. Check for b12 deficiency in light of Crohn's. Spent 32 min with patient, reviewing chart and face to face exam, clinical documentation. I have discussed the diagnosis with the patient and the intended plan as seen in the above orders. Social history, medical history, and labs were reviewed. The patient has received an after-visit summary and questions were answered concerning future plans. I have discussed medication side effects and warnings with the patient as well. Patient/guardian verbalized understanding and accepts plan & risks. Please note that this dictation was completed with [email or web address], the computer voice recognition software. Quite often unanticipated grammatical, syntax, homophones, and other interpretive errors are inadvertently transcribed by the computer software. Please disregard these errors. Please excuse any errors that have escaped final proofreading. Thank you.      MD ABDON Monteiro & DEL ARGUETA [facility]  01/12/23

## 2023-01-17 ENCOUNTER — PATIENT MESSAGE (OUTPATIENT)
Dept: FAMILY MEDICINE CLINIC | Age: 43
End: 2023-01-17

## 2023-01-17 DIAGNOSIS — N92.0 MENORRHAGIA WITH REGULAR CYCLE: Primary | ICD-10-CM

## 2023-01-17 NOTE — TELEPHONE ENCOUNTER
From: Ludwin Keene  To:  Akhil Davis MD  Sent: 1/17/2023 12:54 AM EST  Subject: Passed a golf ball size blood clot     Started my period on the 15th and just about 1245 I passed a golf ball size blood clot would like to know if I should be concerned or should be seen y doctor Austen [place]

## 2023-02-08 DIAGNOSIS — J30.1 ALLERGIC RHINITIS DUE TO POLLEN, UNSPECIFIED SEASONALITY: ICD-10-CM

## 2023-02-08 DIAGNOSIS — F41.9 ANXIETY: ICD-10-CM

## 2023-02-09 DIAGNOSIS — Z72.0 TOBACCO ABUSE: ICD-10-CM

## 2023-02-09 RX ORDER — FLUTICASONE PROPIONATE 50 MCG
2 SPRAY, SUSPENSION (ML) NASAL DAILY
Qty: 16 G | Refills: 5 | Status: SHIPPED | OUTPATIENT
Start: 2023-02-09

## 2023-02-09 RX ORDER — ALBUTEROL SULFATE 90 UG/1
2 AEROSOL, METERED RESPIRATORY (INHALATION)
Qty: 1 EACH | Refills: 1 | Status: SHIPPED | OUTPATIENT
Start: 2023-02-09 | End: 2024-02-04

## 2023-02-09 RX ORDER — CITALOPRAM 10 MG/1
10 TABLET ORAL DAILY
Qty: 30 TABLET | Refills: 1 | Status: SHIPPED | OUTPATIENT
Start: 2023-02-09

## 2023-10-23 ENCOUNTER — NURSE TRIAGE (OUTPATIENT)
Dept: OTHER | Facility: CLINIC | Age: 43
End: 2023-10-23

## 2023-10-23 NOTE — TELEPHONE ENCOUNTER
[facility] [place] [facility]    Received call from [place] at [facility], operated by [facility] with [facility] [employer]. Subjective: Caller states \"It started Thursday. At first it was a little scratchy. It progressed to get worse and worse. My sister said there is white spots in the back of my throat. I have the body aches and the chills. I can barely swallow and talk. \"     Current Symptoms: sore throat-difficulty swallowing, but able to swallow, swelling in throat/white patches in throat, chills, body aches, runny nose, nasal congestion-NO difficulty breathing, unable to open mouth completely without gagging, reduced food intake    Onset: 4 days ago; worsening    Associated Symptoms: reduced activity    Pain Severity: 10/10; throat burning; constant    Temperature: Denies    What has been tried: Tylenol, Sore Throat spray, DayQuil, NyQuil    LMP:  10/3/23  Pregnant: Unknown    Recommended disposition: Go to ED/UCC Now (Or to Office with PCP Approval). Writer unable to reach [facility] SITE. Writer advised patient to be seen at [facility] or ED Now. Patient agreeable and will have someone drive her there now. Care advice provided, patient verbalizes understanding; denies any other questions or concerns; instructed to call back for any new or worsening symptoms. Patient/caller agrees to proceed to [facility] Emergency Department. Attention Provider: Thank you for allowing me to participate in the care of your patient. The patient was connected to triage in response to information provided to the ECC/PSC. Please do not respond through this encounter as the response is not directed to a shared pool.     Reason for Disposition   Unable to open mouth completely    Protocols used: Sore Throat-ADULT-[place]

## 2024-01-15 ENCOUNTER — OFFICE VISIT (OUTPATIENT)
Facility: CLINIC | Age: 44
End: 2024-01-15
Payer: MEDICAID

## 2024-01-15 VITALS
WEIGHT: 293 LBS | HEIGHT: 67 IN | OXYGEN SATURATION: 97 % | HEART RATE: 80 BPM | SYSTOLIC BLOOD PRESSURE: 124 MMHG | TEMPERATURE: 98.2 F | BODY MASS INDEX: 45.99 KG/M2 | DIASTOLIC BLOOD PRESSURE: 82 MMHG | RESPIRATION RATE: 14 BRPM

## 2024-01-15 DIAGNOSIS — Z11.4 ENCOUNTER FOR HIV (HUMAN IMMUNODEFICIENCY VIRUS) TEST: ICD-10-CM

## 2024-01-15 DIAGNOSIS — F41.9 ANXIETY: ICD-10-CM

## 2024-01-15 DIAGNOSIS — Z72.0 TOBACCO ABUSE: ICD-10-CM

## 2024-01-15 DIAGNOSIS — Z79.899 LONG TERM USE OF DRUG: ICD-10-CM

## 2024-01-15 DIAGNOSIS — L03.90 CELLULITIS, UNSPECIFIED CELLULITIS SITE: Primary | ICD-10-CM

## 2024-01-15 DIAGNOSIS — J30.1 ALLERGIC RHINITIS DUE TO POLLEN, UNSPECIFIED SEASONALITY: ICD-10-CM

## 2024-01-15 DIAGNOSIS — Z11.59 NEED FOR HEPATITIS C SCREENING TEST: ICD-10-CM

## 2024-01-15 DIAGNOSIS — Z13.220 ENCOUNTER FOR SCREENING FOR LIPOID DISORDERS: ICD-10-CM

## 2024-01-15 PROBLEM — E66.01 MORBID OBESITY (HCC): Status: ACTIVE | Noted: 2024-01-15

## 2024-01-15 PROBLEM — E55.9 VITAMIN D DEFICIENCY: Status: ACTIVE | Noted: 2024-01-15

## 2024-01-15 PROCEDURE — 99214 OFFICE O/P EST MOD 30 MIN: CPT | Performed by: FAMILY MEDICINE

## 2024-01-15 RX ORDER — ALBUTEROL SULFATE 90 UG/1
2 AEROSOL, METERED RESPIRATORY (INHALATION) EVERY 4 HOURS PRN
Qty: 18 G | Refills: 1 | Status: SHIPPED | OUTPATIENT
Start: 2024-01-15 | End: 2025-01-09

## 2024-01-15 RX ORDER — CITALOPRAM HYDROBROMIDE 10 MG/1
10 TABLET ORAL DAILY
Qty: 90 TABLET | Refills: 1 | Status: SHIPPED | OUTPATIENT
Start: 2024-01-15

## 2024-01-15 RX ORDER — VARENICLINE TARTRATE 0.5 MG/1
.5-1 TABLET, FILM COATED ORAL SEE ADMIN INSTRUCTIONS
Qty: 57 TABLET | Refills: 0 | Status: SHIPPED | OUTPATIENT
Start: 2024-01-15

## 2024-01-15 RX ORDER — DOXYCYCLINE HYCLATE 100 MG
100 TABLET ORAL 2 TIMES DAILY
Qty: 14 TABLET | Refills: 0 | Status: SHIPPED | OUTPATIENT
Start: 2024-01-15 | End: 2024-01-22

## 2024-01-15 RX ORDER — ERGOCALCIFEROL 1.25 MG/1
50000 CAPSULE ORAL WEEKLY
COMMUNITY
Start: 2024-01-03

## 2024-01-15 RX ORDER — FLUTICASONE PROPIONATE 50 MCG
2 SPRAY, SUSPENSION (ML) NASAL DAILY
Qty: 16 G | Refills: 5 | Status: SHIPPED | OUTPATIENT
Start: 2024-01-15

## 2024-01-15 SDOH — ECONOMIC STABILITY: INCOME INSECURITY: HOW HARD IS IT FOR YOU TO PAY FOR THE VERY BASICS LIKE FOOD, HOUSING, MEDICAL CARE, AND HEATING?: HARD

## 2024-01-15 SDOH — ECONOMIC STABILITY: FOOD INSECURITY: WITHIN THE PAST 12 MONTHS, THE FOOD YOU BOUGHT JUST DIDN'T LAST AND YOU DIDN'T HAVE MONEY TO GET MORE.: NEVER TRUE

## 2024-01-15 SDOH — ECONOMIC STABILITY: FOOD INSECURITY: WITHIN THE PAST 12 MONTHS, YOU WORRIED THAT YOUR FOOD WOULD RUN OUT BEFORE YOU GOT MONEY TO BUY MORE.: NEVER TRUE

## 2024-01-15 SDOH — ECONOMIC STABILITY: HOUSING INSECURITY
IN THE LAST 12 MONTHS, WAS THERE A TIME WHEN YOU DID NOT HAVE A STEADY PLACE TO SLEEP OR SLEPT IN A SHELTER (INCLUDING NOW)?: NO

## 2024-01-15 ASSESSMENT — ANXIETY QUESTIONNAIRES
GAD7 TOTAL SCORE: 19
7. FEELING AFRAID AS IF SOMETHING AWFUL MIGHT HAPPEN: 2
5. BEING SO RESTLESS THAT IT IS HARD TO SIT STILL: 3
IF YOU CHECKED OFF ANY PROBLEMS ON THIS QUESTIONNAIRE, HOW DIFFICULT HAVE THESE PROBLEMS MADE IT FOR YOU TO DO YOUR WORK, TAKE CARE OF THINGS AT HOME, OR GET ALONG WITH OTHER PEOPLE: SOMEWHAT DIFFICULT
1. FEELING NERVOUS, ANXIOUS, OR ON EDGE: 2
3. WORRYING TOO MUCH ABOUT DIFFERENT THINGS: 3
6. BECOMING EASILY ANNOYED OR IRRITABLE: 3
4. TROUBLE RELAXING: 3
2. NOT BEING ABLE TO STOP OR CONTROL WORRYING: 3

## 2024-01-15 ASSESSMENT — PATIENT HEALTH QUESTIONNAIRE - PHQ9
9. THOUGHTS THAT YOU WOULD BE BETTER OFF DEAD, OR OF HURTING YOURSELF: 0
SUM OF ALL RESPONSES TO PHQ QUESTIONS 1-9: 7
SUM OF ALL RESPONSES TO PHQ QUESTIONS 1-9: 0
6. FEELING BAD ABOUT YOURSELF - OR THAT YOU ARE A FAILURE OR HAVE LET YOURSELF OR YOUR FAMILY DOWN: 0
SUM OF ALL RESPONSES TO PHQ QUESTIONS 1-9: 0
5. POOR APPETITE OR OVEREATING: 2
1. LITTLE INTEREST OR PLEASURE IN DOING THINGS: 0
SUM OF ALL RESPONSES TO PHQ9 QUESTIONS 1 & 2: 0
7. TROUBLE CONCENTRATING ON THINGS, SUCH AS READING THE NEWSPAPER OR WATCHING TELEVISION: 2
SUM OF ALL RESPONSES TO PHQ QUESTIONS 1-9: 7
3. TROUBLE FALLING OR STAYING ASLEEP: 0
SUM OF ALL RESPONSES TO PHQ QUESTIONS 1-9: 0
2. FEELING DOWN, DEPRESSED OR HOPELESS: 0
SUM OF ALL RESPONSES TO PHQ QUESTIONS 1-9: 0
1. LITTLE INTEREST OR PLEASURE IN DOING THINGS: 0
4. FEELING TIRED OR HAVING LITTLE ENERGY: 3
8. MOVING OR SPEAKING SO SLOWLY THAT OTHER PEOPLE COULD HAVE NOTICED. OR THE OPPOSITE, BEING SO FIGETY OR RESTLESS THAT YOU HAVE BEEN MOVING AROUND A LOT MORE THAN USUAL: 0
10. IF YOU CHECKED OFF ANY PROBLEMS, HOW DIFFICULT HAVE THESE PROBLEMS MADE IT FOR YOU TO DO YOUR WORK, TAKE CARE OF THINGS AT HOME, OR GET ALONG WITH OTHER PEOPLE: 0
SUM OF ALL RESPONSES TO PHQ QUESTIONS 1-9: 7
SUM OF ALL RESPONSES TO PHQ QUESTIONS 1-9: 7
SUM OF ALL RESPONSES TO PHQ9 QUESTIONS 1 & 2: 0
2. FEELING DOWN, DEPRESSED OR HOPELESS: 0

## 2024-01-15 ASSESSMENT — ENCOUNTER SYMPTOMS
SHORTNESS OF BREATH: 0
COUGH: 1
WHEEZING: 0

## 2024-01-15 NOTE — PATIENT INSTRUCTIONS
call ASAP with any spreading redness, warmth, fevers, chills, increasing drainage, pain or other perceived signs of worsening infection or if current symptoms fail to improve and fully resolve as anticipated.

## 2024-01-15 NOTE — PROGRESS NOTES
Chief Complaint   Patient presents with    Skin Problem     Boil on abdomen x1 week,  popped on Tuesday, small hole now          \"Have you been to the ER, urgent care clinic since your last visit?  Hospitalized since your last visit?\"    [facility]     “Have you seen or consulted any other health care providers outside of [facility] since your last visit?”    Dr allison grant         “Have you had a pap smear?”    NO        Health Maintenance Due   Topic Date Due    Hepatitis B vaccine (1 of 3 - 3-dose series) Never done    COVID-19 Vaccine (1) Never done    Varicella vaccine (1 of 2 - 2-dose childhood series) Never done    Pneumococcal 0-64 years Vaccine (1 - PCV) Never done    HIV screen  Never done    Hepatitis C screen  Never done    DTaP/Tdap/Td vaccine (1 - Tdap) Never done    Cervical cancer screen  Never done    Lipids  Never done    Flu vaccine (1) Never done    Depression Screen  01/12/2024       
drug  4. Encounter for HIV (human immunodeficiency virus) test  -     HIV 1/2 Ag/Ab, 4TH Generation,W Rflx Confirm; Future  5. Need for hepatitis C screening test  -     Hepatitis C Antibody; Future  6. Allergic rhinitis due to pollen, unspecified seasonality  -     fluticasone (FLONASE) 50 MCG/ACT nasal spray; 2 sprays by Nasal route daily, Disp-16 g, R-5Normal  7. Anxiety  -     citalopram (CELEXA) 10 MG tablet; Take 1 tablet by mouth daily, Disp-90 tablet, R-1Normal  8. Tobacco abuse  -     albuterol sulfate HFA (PROVENTIL;VENTOLIN;PROAIR) 108 (90 Base) MCG/ACT inhaler; Inhale 2 puffs into the lungs every 4 hours as needed for Wheezing or Shortness of Breath (cough), Disp-18 g, R-1Normal  -     varenicline (CHANTIX) 0.5 MG tablet; Take 1-2 tablets by mouth See Admin Instructions 0.5mg DAILY for 3 days followed by 0.5mg TWICE DAILY for 4 days followed by 1mg TWICE DAILY, Disp-57 tablet, R-0Normal  the following changes in treatment are made start doxy, lab results and schedule of future lab studies reviewed with patient, Very strongly urged to quit smoking to reduce risk of chronic disease, and reviewed medications and side effects in detail  F/U ASAP with MD/ED for any spreading redness, warmth, fevers, chills, increasing drainage, pain or other perceived signs of worsening infection or if current symptoms fail to improve and fully resolve as anticipated.    Return in about 4 weeks (around 2/12/2024) for pap 40 min.   I have discussed the diagnosis with the patient and the intended plan as seen in the above orders.  Social history, medical history, and labs were reviewed.  The patient has received an after-visit summary and questions were answered concerning future plans.  I have discussed medication side effects and warnings with the patient as well. Patient verbalized understanding and accepts plan & risks.   Mary Michele MD  [facility]  01/15/24